# Patient Record
Sex: FEMALE | Race: WHITE | NOT HISPANIC OR LATINO | Employment: OTHER | URBAN - METROPOLITAN AREA
[De-identification: names, ages, dates, MRNs, and addresses within clinical notes are randomized per-mention and may not be internally consistent; named-entity substitution may affect disease eponyms.]

---

## 2023-10-10 ENCOUNTER — HOSPITAL ENCOUNTER (OUTPATIENT)
Facility: HOSPITAL | Age: 73
Discharge: NURSING, REHAB OR LONG-TERM CARE | End: 2023-10-17
Attending: EMERGENCY MEDICINE | Admitting: HOSPITALIST
Payer: MEDICARE

## 2023-10-10 DIAGNOSIS — I50.32 CHRONIC DIASTOLIC HEART FAILURE: ICD-10-CM

## 2023-10-10 DIAGNOSIS — N39.0 URINARY TRACT INFECTION WITHOUT HEMATURIA, SITE UNSPECIFIED: ICD-10-CM

## 2023-10-10 DIAGNOSIS — L89.152 SACRAL DECUBITUS ULCER, STAGE II: ICD-10-CM

## 2023-10-10 DIAGNOSIS — M79.603 ARM PAIN: ICD-10-CM

## 2023-10-10 DIAGNOSIS — R93.89 MEDIASTINAL WIDENING: ICD-10-CM

## 2023-10-10 DIAGNOSIS — R07.9 CHEST PAIN: ICD-10-CM

## 2023-10-10 DIAGNOSIS — N18.9 ACUTE KIDNEY INJURY SUPERIMPOSED ON CHRONIC KIDNEY DISEASE: ICD-10-CM

## 2023-10-10 DIAGNOSIS — I95.1 ORTHOSTATIC HYPOTENSION: ICD-10-CM

## 2023-10-10 DIAGNOSIS — I77.810 AORTIC ROOT DILATATION: Primary | ICD-10-CM

## 2023-10-10 DIAGNOSIS — E86.0 DEHYDRATION: ICD-10-CM

## 2023-10-10 DIAGNOSIS — I49.1 PAC (PREMATURE ATRIAL CONTRACTION): ICD-10-CM

## 2023-10-10 DIAGNOSIS — N17.9 ACUTE KIDNEY INJURY SUPERIMPOSED ON CHRONIC KIDNEY DISEASE: ICD-10-CM

## 2023-10-10 PROBLEM — Z86.73 HISTORY OF CVA (CEREBROVASCULAR ACCIDENT): Status: ACTIVE | Noted: 2023-10-10

## 2023-10-10 PROBLEM — M25.519 SHOULDER PAIN: Status: ACTIVE | Noted: 2023-10-10

## 2023-10-10 PROBLEM — R55 SYNCOPE: Status: ACTIVE | Noted: 2023-10-10

## 2023-10-10 PROBLEM — N18.30 ACUTE RENAL FAILURE SUPERIMPOSED ON STAGE 3 CHRONIC KIDNEY DISEASE: Status: ACTIVE | Noted: 2023-10-10

## 2023-10-10 LAB
ALBUMIN SERPL BCP-MCNC: 3.3 G/DL (ref 3.5–5.2)
ALP SERPL-CCNC: 104 U/L (ref 55–135)
ALT SERPL W/O P-5'-P-CCNC: 7 U/L (ref 10–44)
ANION GAP SERPL CALC-SCNC: 13 MMOL/L (ref 8–16)
AST SERPL-CCNC: 11 U/L (ref 10–40)
BACTERIA #/AREA URNS HPF: ABNORMAL /HPF
BASOPHILS # BLD AUTO: 0.03 K/UL (ref 0–0.2)
BASOPHILS NFR BLD: 0.6 % (ref 0–1.9)
BILIRUB SERPL-MCNC: 0.6 MG/DL (ref 0.1–1)
BILIRUB UR QL STRIP: NEGATIVE
BNP SERPL-MCNC: 86 PG/ML (ref 0–99)
BUN SERPL-MCNC: 78 MG/DL (ref 8–23)
CALCIUM SERPL-MCNC: 9.4 MG/DL (ref 8.7–10.5)
CHLORIDE SERPL-SCNC: 104 MMOL/L (ref 95–110)
CLARITY UR: ABNORMAL
CO2 SERPL-SCNC: 15 MMOL/L (ref 23–29)
COLOR UR: YELLOW
CREAT SERPL-MCNC: 2.6 MG/DL (ref 0.5–1.4)
CREAT UR-MCNC: 60 MG/DL (ref 15–325)
CTP QC/QA: YES
DIFFERENTIAL METHOD: ABNORMAL
EOSINOPHIL # BLD AUTO: 0.1 K/UL (ref 0–0.5)
EOSINOPHIL NFR BLD: 1.6 % (ref 0–8)
ERYTHROCYTE [DISTWIDTH] IN BLOOD BY AUTOMATED COUNT: 12.1 % (ref 11.5–14.5)
EST. GFR  (NO RACE VARIABLE): 19 ML/MIN/1.73 M^2
GLUCOSE SERPL-MCNC: 100 MG/DL (ref 70–110)
GLUCOSE UR QL STRIP: NEGATIVE
HCT VFR BLD AUTO: 34.4 % (ref 37–48.5)
HGB BLD-MCNC: 11 G/DL (ref 12–16)
HGB UR QL STRIP: ABNORMAL
IMM GRANULOCYTES # BLD AUTO: 0.01 K/UL (ref 0–0.04)
IMM GRANULOCYTES NFR BLD AUTO: 0.2 % (ref 0–0.5)
INR PPP: 1 (ref 0.8–1.2)
KETONES UR QL STRIP: NEGATIVE
LACTATE SERPL-SCNC: 2.2 MMOL/L (ref 0.5–2.2)
LEUKOCYTE ESTERASE UR QL STRIP: ABNORMAL
LYMPHOCYTES # BLD AUTO: 0.8 K/UL (ref 1–4.8)
LYMPHOCYTES NFR BLD: 16.9 % (ref 18–48)
MAGNESIUM SERPL-MCNC: 1.8 MG/DL (ref 1.6–2.6)
MCH RBC QN AUTO: 29.7 PG (ref 27–31)
MCHC RBC AUTO-ENTMCNC: 32 G/DL (ref 32–36)
MCV RBC AUTO: 93 FL (ref 82–98)
MICROSCOPIC COMMENT: ABNORMAL
MONOCYTES # BLD AUTO: 0.2 K/UL (ref 0.3–1)
MONOCYTES NFR BLD: 3.5 % (ref 4–15)
NEUTROPHILS # BLD AUTO: 3.7 K/UL (ref 1.8–7.7)
NEUTROPHILS NFR BLD: 77.2 % (ref 38–73)
NITRITE UR QL STRIP: NEGATIVE
NRBC BLD-RTO: 0 /100 WBC
PH UR STRIP: 5 [PH] (ref 5–8)
PLATELET # BLD AUTO: 269 K/UL (ref 150–450)
PMV BLD AUTO: 9.2 FL (ref 9.2–12.9)
POTASSIUM SERPL-SCNC: 4.4 MMOL/L (ref 3.5–5.1)
PROT SERPL-MCNC: 7 G/DL (ref 6–8.4)
PROT UR QL STRIP: ABNORMAL
PROTHROMBIN TIME: 10.9 SEC (ref 9–12.5)
RBC # BLD AUTO: 3.7 M/UL (ref 4–5.4)
RBC #/AREA URNS HPF: 2 /HPF (ref 0–4)
SARS-COV-2 RDRP RESP QL NAA+PROBE: NEGATIVE
SODIUM SERPL-SCNC: 132 MMOL/L (ref 136–145)
SODIUM UR-SCNC: 47 MMOL/L (ref 20–250)
SP GR UR STRIP: 1.01 (ref 1–1.03)
TROPONIN I SERPL DL<=0.01 NG/ML-MCNC: 0.01 NG/ML (ref 0–0.03)
URN SPEC COLLECT METH UR: ABNORMAL
UROBILINOGEN UR STRIP-ACNC: NEGATIVE EU/DL
WBC # BLD AUTO: 4.85 K/UL (ref 3.9–12.7)
WBC #/AREA URNS HPF: >100 /HPF (ref 0–5)
WBC CLUMPS URNS QL MICRO: ABNORMAL

## 2023-10-10 PROCEDURE — 83605 ASSAY OF LACTIC ACID: CPT | Performed by: EMERGENCY MEDICINE

## 2023-10-10 PROCEDURE — 83735 ASSAY OF MAGNESIUM: CPT | Performed by: EMERGENCY MEDICINE

## 2023-10-10 PROCEDURE — 82570 ASSAY OF URINE CREATININE: CPT | Performed by: EMERGENCY MEDICINE

## 2023-10-10 PROCEDURE — 96361 HYDRATE IV INFUSION ADD-ON: CPT

## 2023-10-10 PROCEDURE — 96365 THER/PROPH/DIAG IV INF INIT: CPT

## 2023-10-10 PROCEDURE — 87088 URINE BACTERIA CULTURE: CPT | Performed by: EMERGENCY MEDICINE

## 2023-10-10 PROCEDURE — 93010 EKG 12-LEAD: ICD-10-PCS | Mod: ,,, | Performed by: INTERNAL MEDICINE

## 2023-10-10 PROCEDURE — 83880 ASSAY OF NATRIURETIC PEPTIDE: CPT | Performed by: EMERGENCY MEDICINE

## 2023-10-10 PROCEDURE — 87186 SC STD MICRODIL/AGAR DIL: CPT | Performed by: EMERGENCY MEDICINE

## 2023-10-10 PROCEDURE — G0378 HOSPITAL OBSERVATION PER HR: HCPCS

## 2023-10-10 PROCEDURE — 85610 PROTHROMBIN TIME: CPT | Performed by: EMERGENCY MEDICINE

## 2023-10-10 PROCEDURE — 93010 ELECTROCARDIOGRAM REPORT: CPT | Mod: ,,, | Performed by: INTERNAL MEDICINE

## 2023-10-10 PROCEDURE — 87086 URINE CULTURE/COLONY COUNT: CPT | Performed by: EMERGENCY MEDICINE

## 2023-10-10 PROCEDURE — 87077 CULTURE AEROBIC IDENTIFY: CPT | Performed by: EMERGENCY MEDICINE

## 2023-10-10 PROCEDURE — 80053 COMPREHEN METABOLIC PANEL: CPT | Performed by: EMERGENCY MEDICINE

## 2023-10-10 PROCEDURE — 84300 ASSAY OF URINE SODIUM: CPT | Performed by: EMERGENCY MEDICINE

## 2023-10-10 PROCEDURE — 99285 EMERGENCY DEPT VISIT HI MDM: CPT | Mod: 25

## 2023-10-10 PROCEDURE — 25000003 PHARM REV CODE 250: Performed by: PHYSICIAN ASSISTANT

## 2023-10-10 PROCEDURE — 81000 URINALYSIS NONAUTO W/SCOPE: CPT | Performed by: EMERGENCY MEDICINE

## 2023-10-10 PROCEDURE — 63600175 PHARM REV CODE 636 W HCPCS: Performed by: STUDENT IN AN ORGANIZED HEALTH CARE EDUCATION/TRAINING PROGRAM

## 2023-10-10 PROCEDURE — 25000003 PHARM REV CODE 250: Performed by: STUDENT IN AN ORGANIZED HEALTH CARE EDUCATION/TRAINING PROGRAM

## 2023-10-10 PROCEDURE — 63600175 PHARM REV CODE 636 W HCPCS: Performed by: EMERGENCY MEDICINE

## 2023-10-10 PROCEDURE — 93005 ELECTROCARDIOGRAM TRACING: CPT

## 2023-10-10 PROCEDURE — 85025 COMPLETE CBC W/AUTO DIFF WBC: CPT | Performed by: EMERGENCY MEDICINE

## 2023-10-10 PROCEDURE — 84484 ASSAY OF TROPONIN QUANT: CPT | Performed by: EMERGENCY MEDICINE

## 2023-10-10 PROCEDURE — 87635 SARS-COV-2 COVID-19 AMP PRB: CPT | Performed by: EMERGENCY MEDICINE

## 2023-10-10 PROCEDURE — 96375 TX/PRO/DX INJ NEW DRUG ADDON: CPT

## 2023-10-10 PROCEDURE — 25000003 PHARM REV CODE 250: Performed by: EMERGENCY MEDICINE

## 2023-10-10 PROCEDURE — 87040 BLOOD CULTURE FOR BACTERIA: CPT | Mod: 59 | Performed by: EMERGENCY MEDICINE

## 2023-10-10 RX ORDER — AMOXICILLIN 250 MG
1 CAPSULE ORAL DAILY PRN
Status: DISCONTINUED | OUTPATIENT
Start: 2023-10-10 | End: 2023-10-17 | Stop reason: HOSPADM

## 2023-10-10 RX ORDER — ERGOCALCIFEROL 1.25 MG/1
1 CAPSULE ORAL DAILY
COMMUNITY

## 2023-10-10 RX ORDER — IBUPROFEN 200 MG
24 TABLET ORAL
Status: DISCONTINUED | OUTPATIENT
Start: 2023-10-10 | End: 2023-10-17 | Stop reason: HOSPADM

## 2023-10-10 RX ORDER — LANOLIN ALCOHOL/MO/W.PET/CERES
800 CREAM (GRAM) TOPICAL
Status: DISCONTINUED | OUTPATIENT
Start: 2023-10-10 | End: 2023-10-17 | Stop reason: HOSPADM

## 2023-10-10 RX ORDER — SODIUM CHLORIDE 0.9 % (FLUSH) 0.9 %
10 SYRINGE (ML) INJECTION EVERY 8 HOURS
Status: DISCONTINUED | OUTPATIENT
Start: 2023-10-10 | End: 2023-10-10

## 2023-10-10 RX ORDER — ACETAMINOPHEN 325 MG/1
650 TABLET ORAL EVERY 4 HOURS PRN
Status: DISCONTINUED | OUTPATIENT
Start: 2023-10-10 | End: 2023-10-17 | Stop reason: HOSPADM

## 2023-10-10 RX ORDER — TELMISARTAN 80 MG/1
80 TABLET ORAL DAILY
Status: ON HOLD | COMMUNITY
Start: 2023-08-18 | End: 2023-10-13 | Stop reason: HOSPADM

## 2023-10-10 RX ORDER — SODIUM CHLORIDE 9 MG/ML
125 INJECTION, SOLUTION INTRAVENOUS ONCE
Status: COMPLETED | OUTPATIENT
Start: 2023-10-10 | End: 2023-10-10

## 2023-10-10 RX ORDER — MORPHINE SULFATE 4 MG/ML
4 INJECTION, SOLUTION INTRAMUSCULAR; INTRAVENOUS
Status: COMPLETED | OUTPATIENT
Start: 2023-10-10 | End: 2023-10-10

## 2023-10-10 RX ORDER — SODIUM CHLORIDE 9 MG/ML
INJECTION, SOLUTION INTRAVENOUS CONTINUOUS
Status: DISCONTINUED | OUTPATIENT
Start: 2023-10-10 | End: 2023-10-11

## 2023-10-10 RX ORDER — NALOXONE HCL 0.4 MG/ML
0.02 VIAL (ML) INJECTION
Status: DISCONTINUED | OUTPATIENT
Start: 2023-10-10 | End: 2023-10-17 | Stop reason: HOSPADM

## 2023-10-10 RX ORDER — PNV NO.95/FERROUS FUM/FOLIC AC 28MG-0.8MG
100 TABLET ORAL DAILY
COMMUNITY

## 2023-10-10 RX ORDER — SODIUM CHLORIDE 0.9 % (FLUSH) 0.9 %
10 SYRINGE (ML) INJECTION
Status: DISCONTINUED | OUTPATIENT
Start: 2023-10-10 | End: 2023-10-17 | Stop reason: HOSPADM

## 2023-10-10 RX ORDER — TALC
6 POWDER (GRAM) TOPICAL NIGHTLY PRN
Status: DISCONTINUED | OUTPATIENT
Start: 2023-10-10 | End: 2023-10-17 | Stop reason: HOSPADM

## 2023-10-10 RX ORDER — GLUCAGON 1 MG
1 KIT INJECTION
Status: DISCONTINUED | OUTPATIENT
Start: 2023-10-10 | End: 2023-10-17 | Stop reason: HOSPADM

## 2023-10-10 RX ORDER — IBUPROFEN 200 MG
16 TABLET ORAL
Status: DISCONTINUED | OUTPATIENT
Start: 2023-10-10 | End: 2023-10-17 | Stop reason: HOSPADM

## 2023-10-10 RX ADMIN — MORPHINE SULFATE 4 MG: 4 INJECTION, SOLUTION INTRAMUSCULAR; INTRAVENOUS at 05:10

## 2023-10-10 RX ADMIN — SODIUM CHLORIDE 125 ML/HR: 9 INJECTION, SOLUTION INTRAVENOUS at 05:10

## 2023-10-10 RX ADMIN — SODIUM CHLORIDE: 9 INJECTION, SOLUTION INTRAVENOUS at 10:10

## 2023-10-10 RX ADMIN — SODIUM CHLORIDE 1000 ML: 9 INJECTION, SOLUTION INTRAVENOUS at 05:10

## 2023-10-10 RX ADMIN — CEFTRIAXONE 1 G: 1 INJECTION, POWDER, FOR SOLUTION INTRAMUSCULAR; INTRAVENOUS at 11:10

## 2023-10-10 NOTE — ED PROVIDER NOTES
Encounter Date: 10/10/2023    SCRIBE #1 NOTE: I, Mohamud Paredes, am scribing for, and in the presence of,  Anthony Rosas MD. I have scribed the following portions of the note - Other sections scribed: HPI,ROS,PE.       History     Chief Complaint   Patient presents with    Dizziness     Pt presents to the ED via NO EMS  PT has complaints of intermittent dizziness, fecal / urinary incontinence, non healing wound to buttocks and diarrhea  Per EMS the patient was hypotensive upon arrival in 70/44  EMS administered 1 liter of LR pts /70 at triage  Pt has left sided paralysis (pt leaves at [facility] alone )  Pt Alert at [facility]     Traci Blanca is a 72 y.o. female with a PMHx of CVA (with residual L sided paralysis, 19y ago), who presents to the ED for evaluation of syncope, hypotension onset around two weeks ago. Patient states she has been having multiple syncopal episodes throughout the past two weeks. She also injured her right shoulder around two weeks ago after falling from a chair, which has restricted her activity since. No known head trauma. Today, she decided to activate EMS, and upon EMS arrival to scene, the patient was hypotensive upon arrival in 70/44. EMS administered 1 liter of LR. Upon arrival, patient's BP was 114/70 at triage.    The history is provided by the patient. No  was used.     Review of patient's allergies indicates:  No Known Allergies  No past medical history on file.  No past surgical history on file.  No family history on file.     Review of Systems   Constitutional:  Negative for fever.   HENT:  Negative for congestion, sore throat and trouble swallowing.    Respiratory:  Negative for cough and shortness of breath.    Cardiovascular:  Negative for chest pain.   Gastrointestinal:  Negative for abdominal pain, constipation, diarrhea, nausea and vomiting.   Genitourinary:  Negative for dysuria, flank pain, frequency and urgency.   Musculoskeletal:  Negative for  back pain.   Skin:  Negative for rash.   Neurological:  Positive for syncope. Negative for headaches.       Physical Exam     Initial Vitals [10/10/23 1600]   BP Pulse Resp Temp SpO2   114/70 86 18 97.7 °F (36.5 °C) 97 %      MAP       --         Physical Exam    Nursing note and vitals reviewed.  Constitutional: She appears well-developed and well-nourished.   Minimal dysarthria.   HENT:   Head: Normocephalic and atraumatic.   Mouth dry.   Eyes: EOM are normal. Pupils are equal, round, and reactive to light.   Neck: Neck supple. No thyromegaly present. No JVD present.   Normal range of motion.  Cardiovascular:  Normal rate and regular rhythm.     Exam reveals no gallop and no friction rub.       No murmur heard.  Pulmonary/Chest: Breath sounds normal. No respiratory distress.   Abdominal: Abdomen is soft. Bowel sounds are normal. There is no abdominal tenderness.   Abdomen benign.   Musculoskeletal:         General: No tenderness or edema. Normal range of motion.      Cervical back: Normal range of motion and neck supple.      Comments: Left side paralysis.     Neurological: She is alert and oriented to person, place, and time. She has normal strength. GCS score is 15. GCS eye subscore is 4. GCS verbal subscore is 5. GCS motor subscore is 6.   Skin: Skin is warm and dry. Capillary refill takes less than 2 seconds.   Psychiatric: She has a normal mood and affect.         ED Course   Procedures  Labs Reviewed   CBC W/ AUTO DIFFERENTIAL - Abnormal; Notable for the following components:       Result Value    RBC 3.70 (*)     Hemoglobin 11.0 (*)     Hematocrit 34.4 (*)     Lymph # 0.8 (*)     Mono # 0.2 (*)     Gran % 77.2 (*)     Lymph % 16.9 (*)     Mono % 3.5 (*)     All other components within normal limits   COMPREHENSIVE METABOLIC PANEL - Abnormal; Notable for the following components:    Sodium 132 (*)     CO2 15 (*)     BUN 78 (*)     Creatinine 2.6 (*)     Albumin 3.3 (*)     ALT 7 (*)     eGFR 19 (*)     All  other components within normal limits   URINALYSIS, REFLEX TO URINE CULTURE - Abnormal; Notable for the following components:    Appearance, UA Hazy (*)     Protein, UA Trace (*)     Occult Blood UA Trace (*)     Leukocytes, UA 3+ (*)     All other components within normal limits    Narrative:     Specimen Source->Urine   URINALYSIS MICROSCOPIC - Abnormal; Notable for the following components:    WBC, UA >100 (*)     WBC Clumps, UA Many (*)     Bacteria Moderate (*)     All other components within normal limits    Narrative:     Specimen Source->Urine   CBC W/ AUTO DIFFERENTIAL - Abnormal; Notable for the following components:    RBC 3.98 (*)     MCHC 31.5 (*)     MPV 9.1 (*)     Mono # 0.2 (*)     Mono % 3.5 (*)     All other components within normal limits   COMPREHENSIVE METABOLIC PANEL - Abnormal; Notable for the following components:    Sodium 134 (*)     CO2 14 (*)     BUN 67 (*)     Creatinine 2.3 (*)     Albumin 3.1 (*)     ALT 8 (*)     eGFR 22 (*)     All other components within normal limits   LACTIC ACID, PLASMA   PROTIME-INR   TROPONIN I   B-TYPE NATRIURETIC PEPTIDE   MAGNESIUM   CREATININE, URINE, RANDOM   SODIUM, URINE, RANDOM   SODIUM, URINE, RANDOM    Narrative:     Specimen Source->Urine   CREATININE, URINE, RANDOM    Narrative:     Specimen Source->Urine   SARS-COV-2 RDRP GENE        ECG Results              EKG 12-lead (Final result)  Result time 10/12/23 00:05:57      Final result by Interface, Lab In [facility] (10/12/23 00:05:57)                   Narrative:    Test Reason : I95.1,    Vent. Rate : 085 BPM     Atrial Rate : 085 BPM     P-R Int : 204 ms          QRS Dur : 110 ms      QT Int : 402 ms       P-R-T Axes : 032 -40 039 degrees     QTc Int : 478 ms    Sinus rhythm with Premature atrial complexes  Left axis deviation  Low voltage QRS  Incomplete right bundle branch block  Cannot rule out Anteroseptal infarct (cited on or before 10-OCT-2023)  Abnormal ECG  When compared with ECG of  10-OCT-2023 16:52,  Significant changes have occurred  Confirmed by Delio AGUIRRE, Kelsey HALL ([id number]) on 10/12/2023 12:05:52 AM    Referred By: [email or web address]   SELF           Confirmed By:Kelsey Kebede MD                                  Imaging Results               CT Chest Abdomen Pelvis Without Contrast (XPD) (Final result)  Result time 10/10/23 22:45:58      Final result by Vangie Heard MD (10/10/23 22:45:58)                   Impression:      Aneurysmal dilatation of the ascending thoracic aorta and common iliac arteries.  Moderate atherosclerotic changes.  If concern for dissection, consider additional imaging.    3 mm left apical pulmonary nodule.  For a solid nodule <6 mm, Fleischner Society 2017 guidelines recommend no routine follow up for a low risk patient, or follow-up with non-contrast chest CT at 12 months in a high risk patient.    Indeterminate low-attenuation left adrenal lesion.    Colonic diverticulosis.    Air-fluid level in the urinary bladder.  Differential considerations may include attempted instrumentation with a Fitzgerald catheter, fistulous formation, or gas-forming organism.    This report was flagged in Epic as abnormal.      Electronically signed by: Vangie Heard  Date:    10/10/2023  Time:    22:45               Narrative:    EXAMINATION:  CT CHEST ABDOMEN PELVIS WITHOUT    CLINICAL HISTORY:  Evaluation of syncope.  Hypotension onset 2 weeks ago.    TECHNIQUE:  1.25 mm unenhanced axial images from the lung apices through the greater trochanters were performed.  Coronal and sagittal reformatted images were provided.  No IV contrast was given.    COMPARISON:  None.    FINDINGS:  In the chest, there is aneurysmal dilatation of the ascending thoracic aorta measuring up to 4.9 cm (series 2 axial image 69).  The thoracic aorta is tortuous.  There is moderate atherosclerotic changes.  A 3 mm pulmonary nodule is seen at the left lung apex (series 4 axial image 98).    Within the limits of a  noncontrast examination, in the abdomen, there is no aneurysmal dilatation of the tortuous abdominal aorta.  There are moderate atherosclerotic changes.  The liver, spleen, pancreas, kidneys, and right adrenal gland are unremarkable.  The gallbladder is surgically absent.  There is a low-attenuation lobular left adrenal lesion measuring 2.0 x 1.8 cm (series 2 axial image 118). There is no gross abdominal adenopathy or ascites.  Moderate atherosclerotic disease is present.    In the pelvis, the right common iliac artery measures up to 2 cm (series 2 axial image 175).  The left common iliac artery measures up to 1.7 cm (series 2 axial image 179).  There are no pelvic masses or adenopathy.  There is no free pelvic fluid.  There is colonic diverticulosis.  There is a calcified uterine fibroid.  A small air-fluid level is seen in the urinary bladder.  Marginal and bridging osteophytes are present.                                        X-Ray Chest AP Portable (Final result)  Result time 10/10/23 18:27:04      Final result by Da Martinez MD (10/10/23 18:27:04)                   Impression:      This report was flagged in Epic as abnormal.    1. Interstitial findings may reflect underlying COPD/emphysema.  No large focal consolidation.  2. There is prominence of the aortic arch, correlation with previous exams advised, PA and lateral could be performed for further evaluation as warranted.      Electronically signed by: Da Martinez MD  Date:    10/10/2023  Time:    18:27               Narrative:    EXAMINATION:  XR CHEST AP PORTABLE    CLINICAL HISTORY:  hypotension;    TECHNIQUE:  Single frontal view of the chest was performed.    COMPARISON:  None    FINDINGS:  The cardiomediastinal silhouette is not enlarged noting prominence of the aorta..  There is no pleural effusion.  The trachea is midline.  The lungs are symmetrically expanded bilaterally with coarse interstitial attenuation.  There are a few scattered  calcified granuloma.  No large focal consolidation seen.  There is no pneumothorax.  The osseous structures are remarkable for degenerative change..                                       X-Ray Humerus 2 View Right (Final result)  Result time 10/10/23 18:05:56      Final result by Jade Rodriguez MD (10/10/23 18:05:56)                   Impression:      No acute findings.      Electronically signed by: Jade Rodriguez  Date:    10/10/2023  Time:    18:05               Narrative:    EXAMINATION:  XR HUMERUS 2 VIEW RIGHT    CLINICAL HISTORY:  Pain in arm, unspecified    TECHNIQUE:  Four views    COMPARISON:  None    FINDINGS:  Osteopenia.  No acute fracture or dislocation.  Mild glenohumeral and moderate acromioclavicular joint osteoarthritis.                                       Medications   melatonin tablet 6 mg (has no administration in time range)   senna-docusate 8.6-50 mg per tablet 1 tablet (has no administration in time range)   acetaminophen tablet 650 mg (650 mg Oral Given 10/12/23 0602)   naloxone 0.4 mg/mL injection 0.02 mg (has no administration in time range)   magnesium oxide tablet 800 mg (has no administration in time range)   magnesium oxide tablet 800 mg (has no administration in time range)   glucose chewable tablet 16 g (has no administration in time range)   glucose chewable tablet 24 g (has no administration in time range)   glucagon (human recombinant) injection 1 mg (has no administration in time range)   sodium chloride 0.9% flush 10 mL (has no administration in time range)   cefTRIAXone (ROCEPHIN) 1 g in dextrose 5 % in water (D5W) 100 mL IVPB (MB+) (0 g Intravenous Stopped 10/11/23 2347)   oxyCODONE-acetaminophen 5-325 mg per tablet 1 tablet (0 tablets Oral Return to Cabinet 10/11/23 2020)   heparin (porcine) injection 5,000 Units (5,000 Units Subcutaneous Not Given 10/12/23 1400)   regadenoson injection 0.4 mg ( Intravenous Canceled Entry 10/12/23 1130)   0.9%  NaCl infusion (  Intravenous New Bag 10/12/23 1317)   magnesium sulfate in dextrose IVPB (premix) 1 g (1 g Intravenous New Bag 10/12/23 1317)   sodium chloride 0.9% bolus 1,000 mL 1,000 mL (0 mLs Intravenous Stopped 10/10/23 1825)   0.9%  NaCl infusion (0 mL/hr Intravenous Stopped 10/10/23 1800)   morphine injection 4 mg (4 mg Intravenous Given 10/10/23 1731)   cefTRIAXone (ROCEPHIN) 1 g in dextrose 5 % in water (D5W) 100 mL IVPB (MB+) (0 g Intravenous Stopped 10/10/23 2341)   morphine injection 4 mg (4 mg Intravenous Given 10/11/23 0252)     Medical Decision Making  72 y.o. female with a PMHx of CVA, who presents to the ED for evaluation of syncope onset around two weeks ago. On exam, minimal dysarthria, mouth is dry, there is left side paralysis, and abdomen is benign. In shared decision making, I will order labs and imaging.    Amount and/or Complexity of Data Reviewed  Labs: ordered.  Radiology: ordered.    Risk  OTC drugs.  Prescription drug management.    Pt with history of stroke resulting in left sided paralysis states she lives alone in an apartment in [place] and has been able to care for herself well up til two weeks ago when she feel and injured her right shoulder. With decreased functionality with right arm and history of left arm paralysis pt reports difficulty caring for self . She reports being seen at [facility] ER after the fall and discharged.  Called EMS due to dizziness. EMS found patient to have BP 70's/40's. Improved with IVFs. PT's main complaint other than self care difficulties is buttocks pain. SHe has been eating and drinking less due to concern about sitting in her own urine and stool. She reports intermittent help from friends and neighbors. Work up shows, uti, allen on top of CKI, incidental widened mediastinum. NO CP. BP similar in all 4 extremities. Pt needs IV Hydration, UTI tx, Wound care for sacral skin break down, PT/OT evaluation and rehab placement.         Scribe Attestation:   Scribe #1: I  performed the above scribed service and the documentation accurately describes the services I performed. I attest to the accuracy of the note.              I, Anthony Rosas, personally performed the services described in this documentation.  All medical record entries made by the scribe were at my direction and in my presence.  I have reviewed the chart and agree that the record reflects my personal performance and is accurate and complete.           Clinical Impression:   Final diagnoses:  [I95.1] Orthostatic hypotension  [M79.603] Arm pain  [E86.0] Dehydration  [R93.89] Mediastinal widening  [R07.9] Chest pain  [N17.9, N18.9] Acute kidney injury superimposed on chronic kidney disease (Primary)  [N39.0] Urinary tract infection without hematuria, site unspecified  [L89.152] Sacral decubitus ulcer, stage II        ED Disposition Condition    Observation Stable                Anthony Rosas MD  10/12/23 4412

## 2023-10-10 NOTE — Clinical Note
Diagnosis: Dehydration [276.51.ICD-9-CM]   Future Attending Provider: ADEEL CONTEH [[id number]]   Admitting Provider:: ADEEL CONTEH [[id number]]

## 2023-10-11 PROBLEM — I49.3 PVC (PREMATURE VENTRICULAR CONTRACTION): Status: ACTIVE | Noted: 2023-10-11

## 2023-10-11 PROBLEM — N39.0 URINARY TRACT INFECTION WITHOUT HEMATURIA: Status: ACTIVE | Noted: 2023-10-11

## 2023-10-11 PROBLEM — E87.20 METABOLIC ACIDOSIS: Status: ACTIVE | Noted: 2023-10-11

## 2023-10-11 LAB
ALBUMIN SERPL BCP-MCNC: 3.1 G/DL (ref 3.5–5.2)
ALP SERPL-CCNC: 102 U/L (ref 55–135)
ALT SERPL W/O P-5'-P-CCNC: 8 U/L (ref 10–44)
ANION GAP SERPL CALC-SCNC: 12 MMOL/L (ref 8–16)
ASCENDING AORTA: 4.64 CM
AST SERPL-CCNC: 13 U/L (ref 10–40)
AV INDEX (PROSTH): 0.98
AV MEAN GRADIENT: 4 MMHG
AV PEAK GRADIENT: 6 MMHG
AV REGURGITATION PRESSURE HALF TIME: 876.68 MS
AV VALVE AREA BY VELOCITY RATIO: 2.94 CM²
AV VALVE AREA: 3.1 CM²
AV VELOCITY RATIO: 0.93
BASOPHILS # BLD AUTO: 0.03 K/UL (ref 0–0.2)
BASOPHILS NFR BLD: 0.6 % (ref 0–1.9)
BILIRUB SERPL-MCNC: 0.4 MG/DL (ref 0.1–1)
BUN SERPL-MCNC: 67 MG/DL (ref 8–23)
CALCIUM SERPL-MCNC: 9 MG/DL (ref 8.7–10.5)
CHLORIDE SERPL-SCNC: 108 MMOL/L (ref 95–110)
CO2 SERPL-SCNC: 14 MMOL/L (ref 23–29)
CREAT SERPL-MCNC: 2.3 MG/DL (ref 0.5–1.4)
CV ECHO LV RWT: 0.53 CM
DIFFERENTIAL METHOD: ABNORMAL
DOP CALC AO PEAK VEL: 1.26 M/S
DOP CALC AO VTI: 22.1 CM
DOP CALC LVOT AREA: 3.2 CM2
DOP CALC LVOT DIAMETER: 2.01 CM
DOP CALC LVOT PEAK VEL: 1.17 M/S
DOP CALC LVOT STROKE VOLUME: 68.5 CM3
DOP CALC MV VTI: 15.9 CM
DOP CALCLVOT PEAK VEL VTI: 21.6 CM
E WAVE DECELERATION TIME: 184.18 MSEC
E/A RATIO: 0.84
E/E' RATIO: 10.17 M/S
ECHO LV POSTERIOR WALL: 1.12 CM (ref 0.6–1.1)
EOSINOPHIL # BLD AUTO: 0.1 K/UL (ref 0–0.5)
EOSINOPHIL NFR BLD: 1.9 % (ref 0–8)
ERYTHROCYTE [DISTWIDTH] IN BLOOD BY AUTOMATED COUNT: 12.2 % (ref 11.5–14.5)
EST. GFR  (NO RACE VARIABLE): 22 ML/MIN/1.73 M^2
FRACTIONAL SHORTENING: 28 % (ref 28–44)
GLUCOSE SERPL-MCNC: 88 MG/DL (ref 70–110)
HCT VFR BLD AUTO: 38.4 % (ref 37–48.5)
HGB BLD-MCNC: 12.1 G/DL (ref 12–16)
IMM GRANULOCYTES # BLD AUTO: 0.02 K/UL (ref 0–0.04)
IMM GRANULOCYTES NFR BLD AUTO: 0.4 % (ref 0–0.5)
INTERVENTRICULAR SEPTUM: 1.06 CM (ref 0.6–1.1)
IVC DIAMETER: 1.82 CM
LA MAJOR: 4.19 CM
LA MINOR: 4.83 CM
LA WIDTH: 2.5 CM
LEFT ATRIUM SIZE: 2.82 CM
LEFT ATRIUM VOLUME: 26.89 CM3
LEFT INTERNAL DIMENSION IN SYSTOLE: 3.02 CM (ref 2.1–4)
LEFT VENTRICLE DIASTOLIC VOLUME: 78 ML
LEFT VENTRICLE SYSTOLIC VOLUME: 35.66 ML
LEFT VENTRICULAR INTERNAL DIMENSION IN DIASTOLE: 4.19 CM (ref 3.5–6)
LEFT VENTRICULAR MASS: 154.45 G
LV LATERAL E/E' RATIO: 6.78 M/S
LV SEPTAL E/E' RATIO: 20.33 M/S
LVOT MG: 2.65 MMHG
LVOT MV: 0.75 CM/S
LYMPHOCYTES # BLD AUTO: 1.1 K/UL (ref 1–4.8)
LYMPHOCYTES NFR BLD: 22.3 % (ref 18–48)
MCH RBC QN AUTO: 30.4 PG (ref 27–31)
MCHC RBC AUTO-ENTMCNC: 31.5 G/DL (ref 32–36)
MCV RBC AUTO: 97 FL (ref 82–98)
MONOCYTES # BLD AUTO: 0.2 K/UL (ref 0.3–1)
MONOCYTES NFR BLD: 3.5 % (ref 4–15)
MV MEAN GRADIENT: 1 MMHG
MV PEAK A VEL: 0.73 M/S
MV PEAK E VEL: 0.61 M/S
MV PEAK GRADIENT: 2 MMHG
MV STENOSIS PRESSURE HALF TIME: 53.41 MS
MV VALVE AREA BY CONTINUITY EQUATION: 4.31 CM2
MV VALVE AREA P 1/2 METHOD: 4.12 CM2
NEUTROPHILS # BLD AUTO: 3.4 K/UL (ref 1.8–7.7)
NEUTROPHILS NFR BLD: 71.3 % (ref 38–73)
NRBC BLD-RTO: 0 /100 WBC
PISA AR MAX VEL: 3.98 M/S
PISA TR MAX VEL: 1.89 M/S
PLATELET # BLD AUTO: 245 K/UL (ref 150–450)
PMV BLD AUTO: 9.1 FL (ref 9.2–12.9)
POTASSIUM SERPL-SCNC: 4.2 MMOL/L (ref 3.5–5.1)
PROT SERPL-MCNC: 6.8 G/DL (ref 6–8.4)
PV PEAK GRADIENT: 2 MMHG
PV PEAK VELOCITY: 0.67 M/S
RA MAJOR: 4.09 CM
RA PRESSURE ESTIMATED: 3 MMHG
RA WIDTH: 3.5 CM
RBC # BLD AUTO: 3.98 M/UL (ref 4–5.4)
RIGHT VENTRICULAR END-DIASTOLIC DIMENSION: 3.34 CM
RV TB RVSP: 5 MMHG
RV TISSUE DOPPLER FREE WALL SYSTOLIC VELOCITY 1 (APICAL 4 CHAMBER VIEW): 16.59 CM/S
SINUS: 3.77 CM
SODIUM SERPL-SCNC: 134 MMOL/L (ref 136–145)
STJ: 3.28 CM
TDI LATERAL: 0.09 M/S
TDI SEPTAL: 0.03 M/S
TDI: 0.06 M/S
TR MAX PG: 14 MMHG
TRICUSPID ANNULAR PLANE SYSTOLIC EXCURSION: 1.9 CM
TV REST PULMONARY ARTERY PRESSURE: 17 MMHG
WBC # BLD AUTO: 4.79 K/UL (ref 3.9–12.7)

## 2023-10-11 PROCEDURE — 93005 ELECTROCARDIOGRAM TRACING: CPT

## 2023-10-11 PROCEDURE — 80053 COMPREHEN METABOLIC PANEL: CPT | Performed by: PHYSICIAN ASSISTANT

## 2023-10-11 PROCEDURE — 93010 EKG 12-LEAD: ICD-10-PCS | Mod: ,,, | Performed by: INTERNAL MEDICINE

## 2023-10-11 PROCEDURE — 96376 TX/PRO/DX INJ SAME DRUG ADON: CPT

## 2023-10-11 PROCEDURE — 63600175 PHARM REV CODE 636 W HCPCS: Performed by: HOSPITALIST

## 2023-10-11 PROCEDURE — 96361 HYDRATE IV INFUSION ADD-ON: CPT

## 2023-10-11 PROCEDURE — 85025 COMPLETE CBC W/AUTO DIFF WBC: CPT | Performed by: PHYSICIAN ASSISTANT

## 2023-10-11 PROCEDURE — 99213 PR OFFICE/OUTPT VISIT, EST, LEVL III, 20-29 MIN: ICD-10-PCS | Mod: 25,,, | Performed by: INTERNAL MEDICINE

## 2023-10-11 PROCEDURE — 99213 OFFICE O/P EST LOW 20 MIN: CPT | Mod: 25,,, | Performed by: INTERNAL MEDICINE

## 2023-10-11 PROCEDURE — 25000003 PHARM REV CODE 250: Performed by: HOSPITALIST

## 2023-10-11 PROCEDURE — 96366 THER/PROPH/DIAG IV INF ADDON: CPT

## 2023-10-11 PROCEDURE — 63600175 PHARM REV CODE 636 W HCPCS: Performed by: NURSE PRACTITIONER

## 2023-10-11 PROCEDURE — G0378 HOSPITAL OBSERVATION PER HR: HCPCS

## 2023-10-11 PROCEDURE — 93010 ELECTROCARDIOGRAM REPORT: CPT | Mod: ,,, | Performed by: INTERNAL MEDICINE

## 2023-10-11 PROCEDURE — 96365 THER/PROPH/DIAG IV INF INIT: CPT | Mod: 59

## 2023-10-11 RX ORDER — HEPARIN SODIUM 5000 [USP'U]/ML
5000 INJECTION, SOLUTION INTRAVENOUS; SUBCUTANEOUS EVERY 8 HOURS
Status: DISCONTINUED | OUTPATIENT
Start: 2023-10-11 | End: 2023-10-17 | Stop reason: HOSPADM

## 2023-10-11 RX ORDER — MORPHINE SULFATE 4 MG/ML
4 INJECTION, SOLUTION INTRAMUSCULAR; INTRAVENOUS ONCE
Status: COMPLETED | OUTPATIENT
Start: 2023-10-11 | End: 2023-10-11

## 2023-10-11 RX ORDER — OXYCODONE AND ACETAMINOPHEN 5; 325 MG/1; MG/1
1 TABLET ORAL EVERY 4 HOURS PRN
Status: DISCONTINUED | OUTPATIENT
Start: 2023-10-11 | End: 2023-10-12

## 2023-10-11 RX ADMIN — MORPHINE SULFATE 4 MG: 4 INJECTION, SOLUTION INTRAMUSCULAR; INTRAVENOUS at 02:10

## 2023-10-11 RX ADMIN — OXYCODONE AND ACETAMINOPHEN 1 TABLET: 5; 325 TABLET ORAL at 08:10

## 2023-10-11 RX ADMIN — SODIUM BICARBONATE: 84 INJECTION, SOLUTION INTRAVENOUS at 06:10

## 2023-10-11 RX ADMIN — SODIUM BICARBONATE: 84 INJECTION, SOLUTION INTRAVENOUS at 09:10

## 2023-10-11 RX ADMIN — CEFTRIAXONE 1 G: 1 INJECTION, POWDER, FOR SOLUTION INTRAMUSCULAR; INTRAVENOUS at 11:10

## 2023-10-11 NOTE — SUBJECTIVE & OBJECTIVE
No past medical history on file.    No past surgical history on file.    Review of patient's allergies indicates:  No Known Allergies    No current facility-administered medications on file prior to encounter.     Current Outpatient Medications on File Prior to Encounter   Medication Sig    cyanocobalamin (VITAMIN B-12) 100 MCG tablet Take 100 mcg by mouth once daily.    ergocalciferol (ERGOCALCIFEROL) 50,000 unit Cap Take 1 capsule by mouth Daily.    telmisartan (MICARDIS) 80 MG Tab Take 80 mg by mouth Daily.     Family History    None       Tobacco Use    Smoking status: Not on file    Smokeless tobacco: Not on file   Substance and Sexual Activity    Alcohol use: Not on file    Drug use: Not on file    Sexual activity: Not on file     Review of Systems   Constitutional:  Negative for chills and fever.   HENT:  Negative for nosebleeds and tinnitus.    Eyes:  Negative for photophobia and visual disturbance.   Respiratory:  Negative for shortness of breath and wheezing.    Cardiovascular:  Negative for chest pain, palpitations and leg swelling.   Gastrointestinal:  Negative for abdominal distention, nausea and vomiting.   Genitourinary:  Negative for dysuria, flank pain and hematuria.   Musculoskeletal:  Positive for arthralgias. Negative for gait problem and joint swelling.   Skin:  Negative for rash and wound.   Neurological:  Negative for seizures and syncope.     Objective:     Vital Signs (Most Recent):  Temp: 97.7 °F (36.5 °C) (10/10/23 1600)  Pulse: 65 (10/10/23 1833)  Resp: 16 (10/10/23 1731)  BP: 134/62 (10/10/23 1832)  SpO2: 97 % (10/10/23 1832) Vital Signs (24h Range):  Temp:  [97.7 °F (36.5 °C)] 97.7 °F (36.5 °C)  Pulse:  [65-89] 65  Resp:  [16-20] 16  SpO2:  [95 %-99 %] 97 %  BP: ()/(62-75) 134/62        There is no height or weight on file to calculate BMI.     Physical Exam  Vitals and nursing note reviewed.   Constitutional:       General: She is not in acute distress.     Appearance: She is  well-developed. She is not diaphoretic.   HENT:      Head: Normocephalic and atraumatic.      Right Ear: External ear normal.      Left Ear: External ear normal.   Eyes:      General:         Right eye: No discharge.         Left eye: No discharge.      Conjunctiva/sclera: Conjunctivae normal.   Neck:      Thyroid: No thyromegaly.   Cardiovascular:      Rate and Rhythm: Normal rate and regular rhythm.      Heart sounds: No murmur heard.  Pulmonary:      Effort: Pulmonary effort is normal. No respiratory distress.      Breath sounds: Normal breath sounds.   Abdominal:      General: Bowel sounds are normal. There is no distension.      Palpations: Abdomen is soft. There is no mass.      Tenderness: There is no abdominal tenderness.   Musculoskeletal:         General: No deformity.      Cervical back: Normal range of motion and neck supple.      Right lower leg: No edema.      Left lower leg: No edema.   Skin:     General: Skin is warm and dry.   Neurological:      Mental Status: She is alert and oriented to person, place, and time.      Sensory: No sensory deficit.      Comments: Right sided upper and lower paralysis. Baseline per patient since previous CVA   Psychiatric:         Mood and Affect: Mood normal.         Behavior: Behavior normal.                Significant Labs: CBC:   Recent Labs   Lab 10/10/23  1714   WBC 4.85   HGB 11.0*   HCT 34.4*        CMP:   Recent Labs   Lab 10/10/23  1714   *   K 4.4      CO2 15*      BUN 78*   CREATININE 2.6*   CALCIUM 9.4   PROT 7.0   ALBUMIN 3.3*   BILITOT 0.6   ALKPHOS 104   AST 11   ALT 7*   ANIONGAP 13     Cardiac Markers:   Recent Labs   Lab 10/10/23  1714   BNP 86     Coagulation:   Recent Labs   Lab 10/10/23  1714   INR 1.0     Lactic Acid:   Recent Labs   Lab 10/10/23  1714   LACTATE 2.2     Troponin:   Recent Labs   Lab 10/10/23  1714   TROPONINI 0.011     Urine Studies:   Recent Labs   Lab 10/10/23  1903   COLORU Yellow   APPEARANCEUA Hazy*    PHUR 5.0   SPECGRAV 1.010   PROTEINUA Trace*   GLUCUA Negative   KETONESU Negative   BILIRUBINUA Negative   OCCULTUA Trace*   NITRITE Negative   UROBILINOGEN Negative   LEUKOCYTESUR 3+*   RBCUA 2   WBCUA >100*   BACTERIA Moderate*       Significant Imaging:   Imaging Results               X-Ray Chest AP Portable (Final result)  Result time 10/10/23 18:27:04      Final result by Da Martinez MD (10/10/23 18:27:04)                   Impression:      This report was flagged in Epic as abnormal.    1. Interstitial findings may reflect underlying COPD/emphysema.  No large focal consolidation.  2. There is prominence of the aortic arch, correlation with previous exams advised, PA and lateral could be performed for further evaluation as warranted.      Electronically signed by: Da Martinez MD  Date:    10/10/2023  Time:    18:27               Narrative:    EXAMINATION:  XR CHEST AP PORTABLE    CLINICAL HISTORY:  hypotension;    TECHNIQUE:  Single frontal view of the chest was performed.    COMPARISON:  None    FINDINGS:  The cardiomediastinal silhouette is not enlarged noting prominence of the aorta..  There is no pleural effusion.  The trachea is midline.  The lungs are symmetrically expanded bilaterally with coarse interstitial attenuation.  There are a few scattered calcified granuloma.  No large focal consolidation seen.  There is no pneumothorax.  The osseous structures are remarkable for degenerative change..                                       X-Ray Humerus 2 View Right (Final result)  Result time 10/10/23 18:05:56      Final result by Jade Rodriguez MD (10/10/23 18:05:56)                   Impression:      No acute findings.      Electronically signed by: Jade Rodriguez  Date:    10/10/2023  Time:    18:05               Narrative:    EXAMINATION:  XR HUMERUS 2 VIEW RIGHT    CLINICAL HISTORY:  Pain in arm, unspecified    TECHNIQUE:  Four views    COMPARISON:  None    FINDINGS:  Osteopenia.   No acute fracture or dislocation.  Mild glenohumeral and moderate acromioclavicular joint osteoarthritis.

## 2023-10-11 NOTE — PLAN OF CARE
[place] - Emergency Dept  Discharge Assessment    Primary Care Provider: Luis Miguel López MD on [facility] (This provider is not in EPIC)  Case Management Assessment     PCP: See above  Pharmacy: [facility] on [facility] or any other provider.  [place] and [place].    Patient Arrived From: home alone  Existing Help at Home: Paid sitters.  Patient has limited family support. Her  is  and she has no children.  Has been helped by neighbors and friends since shoulder injury and being discharged from [facility] ED about two weeks ago.    Barriers to Discharge: Limited/no family support    Discharge Plan:    A. SNF   B. Assisted Living      Discharge planning assessment completed with patient's assistance.  Patient is from home alone.  She is wheelchair bound but reported being independent with ADLs prior to a shoulder injury two weeks ago.  Patient reported that she was able to transfer, perform grooming and hygiene activities as well as cook and prepare her own meals.      Patient reported that she had been in contact with her PCP (Dr. Littlejohn) and PHN since the shoulder injury trying to get into a skilled nursing facility.  Patient reported that her insurance provider is in agreement with her receiving SNF services so that she might return to her PLOF.       Patient reported that her new PCP is Dr. Luis Miguel López ([facility] on [facility].  She stated that she saw Dr. López a couple of days ago. Patient uses a paid provider ([employer]) for transportation to and from doctor appointments.     Patient is hoping to discharge to SNF with the goal of returning to home post SNF.      Note: Patient's date of birth is incorrect in EPIC.  Her correct YOB: 1950.  Patient stated that she notified Registration of the error but was told that it could not be corrected because she didn't have her physical 's License with her.       Discharge  Assessment (most recent)       BRIEF DISCHARGE ASSESSMENT - 10/11/23 0922          Discharge Planning    Assessment Type Discharge Planning Brief Assessment     Resource/Environmental Concerns home accessibility     Support Systems Friends/neighbors   Had been depending on friends to help.  Hire a sitter service after shoulder injury but worker was not helpful.    Assistance Needed Since shoulder injury, has needed assistance with transfers and ADLs.  Prior to shoulder injury was active in the community and was able to perform IADLs and BADLs. Is wheelchair bound.     Equipment Currently Used at Home wheelchair     Current Living Arrangements home     Care Facility Name n/a     Patient/Family Anticipates Transition to long-term care facility   SNF    Patient/Family Anticipated Services at Transition skilled nursing   SNF to home.  Open facility within [facility].  Would prefer not to go to [facility].    DME Needed Upon Discharge  none     Discharge Plan A Skilled Nursing Facility     Discharge Plan B Assisted Living

## 2023-10-11 NOTE — HPI
Traci Blanca 72 y.o. female with history of CVA with residual left sided deficits, CKD3, history of PE no longer on anticoagulation presents to the hospital with a chief complaint of syncope.  She reports multiple syncopal episodes over the last week which she attributes to dehydration.  She reports she is been unable to eat for the last week as she was unable to care for her activities of daily since a fall and resultant right shoulder pain 2 weeks ago.  She reports she was examined at a previous hospital.  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  She denies fever chest pain shortness of breath nausea vomiting abdominal leg swelling melena hematuria hematemesis dizziness.  She reports she was unable to the tolerate Eliquis for previously diagnosed PE due to bleeding complications.    In the ED, chest x-ray with prominence of the aortic arch correlation creatinine 2.6 lactic acid negative troponin negative BNP negative.

## 2023-10-11 NOTE — ASSESSMENT & PLAN NOTE
Presents with Cr of 2.6, baseline of 1.9 to 2.1 suspect related to decreased PO intake as unable to eat due to baseline paralysis and right sided shoulder pain now limit ADLs  Started on IVF  Renal dose medications, avoid nephrotoxic drugs, monitor intake and output  Home telmisartan held        Patient with acute kidney injury/acute renal failure likely due to pre-renal azotemia due to dehydration WALT is currently stable. Baseline creatinine 1.9 to 2.1 - Labs reviewed- Renal function/electrolytes with CrCl cannot be calculated (Unknown ideal weight.). according to latest data. Monitor urine output and serial BMP and adjust therapy as needed. Avoid nephrotoxins and renally dose meds for GFR listed above.  Changed  IVF  to bicarb drip for worsening acidosis.  Check bladder  scan

## 2023-10-11 NOTE — PLAN OF CARE
Call placed to Tita with [facility] to advised of SNF request for pt.    PASRR completed and LOCET called in. TN to follow for 142 via email.

## 2023-10-11 NOTE — NURSING
Patient arrived to floor via stretcher. Sliding board with 3 assists to transfer patient from stretcher to bed. IVF infusing. Cardiac monitor in place. Wound care consult ordered. No complaints at this time.

## 2023-10-11 NOTE — PROGRESS NOTES
[facility] Medicine  Progress Note    Patient Name: Traci Blanca  MRN: [id number]  Patient Class: OP- Observation   Admission Date: 10/10/2023  Length of Stay: 0 days  Attending Physician: Mary Segura MD  Primary Care Provider: No primary care provider on file.        Subjective:     Principal Problem:Acute renal failure superimposed on stage 3 chronic kidney disease        HPI:  Traci Blanca 72 y.o. female with history of CVA with residual left sided deficits, CKD3, history of PE no longer on anticoagulation presents to the hospital with a chief complaint of syncope.  She reports multiple syncopal episodes over the last week which she attributes to dehydration.  She reports she is been unable to eat for the last week as she was unable to care for her activities of daily since a fall and resultant right shoulder pain 2 weeks ago.  She reports she was examined at a previous hospital.  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  She denies fever chest pain shortness of breath nausea vomiting abdominal leg swelling melena hematuria hematemesis dizziness.  She reports she was unable to the tolerate Eliquis for previously diagnosed PE due to bleeding complications.    In the ED, chest x-ray with prominence of the aortic arch correlation creatinine 2.6 lactic acid negative troponin negative BNP negative.      Overview/Hospital Course:  Traci Blanca 72 y.o. female with history of CVA with residual left sided deficits, CKD3, history of PE no longer on anticoagulation presents to the hospital with a chief complaint of syncope.  She reports multiple syncopal episodes over the last week which she attributes to dehydration.   Patient has ARF on CKD and is on IVF,changed  to bicarb for acidosis,check bladder scan  Started on Rocephin for UTI.  No fracture on imaging,  Consult wound care for sacral wounds,  PT,OT.      No past medical history on file.    No past surgical  history on file.    Review of patient's allergies indicates:  No Known Allergies    No current facility-administered medications on file prior to encounter.     Current Outpatient Medications on File Prior to Encounter   Medication Sig    cyanocobalamin (VITAMIN B-12) 100 MCG tablet Take 100 mcg by mouth once daily.    ergocalciferol (ERGOCALCIFEROL) 50,000 unit Cap Take 1 capsule by mouth Daily.    telmisartan (MICARDIS) 80 MG Tab Take 80 mg by mouth Daily.     Family History    None       Tobacco Use    Smoking status: Not on file    Smokeless tobacco: Not on file   Substance and Sexual Activity    Alcohol use: Not on file    Drug use: Not on file    Sexual activity: Not on file     Review of Systems   Constitutional:  Negative for chills and fever.   HENT:  Negative for nosebleeds and tinnitus.    Eyes:  Negative for photophobia and visual disturbance.   Respiratory:  Negative for shortness of breath and wheezing.    Cardiovascular:  Negative for chest pain, palpitations and leg swelling.   Gastrointestinal:  Negative for abdominal distention, nausea and vomiting.   Genitourinary:  Negative for dysuria, flank pain and hematuria.   Musculoskeletal:  Positive for arthralgias. Negative for gait problem and joint swelling.   Skin:  Negative for rash and wound.   Neurological:  Negative for seizures and syncope.     Objective:     Vital Signs (Most Recent):  Temp: 98 °F (36.7 °C) (10/10/23 2233)  Pulse: 94 (10/11/23 0828)  Resp: 18 (10/11/23 0828)  BP: 127/67 (10/11/23 0828)  SpO2: 97 % (10/11/23 0406) Vital Signs (24h Range):  Temp:  [97.7 °F (36.5 °C)-98 °F (36.7 °C)] 98 °F (36.7 °C)  Pulse:  [48-94] 94  Resp:  [14-24] 18  SpO2:  [95 %-100 %] 97 %  BP: ()/(40-75) 127/67        There is no height or weight on file to calculate BMI.     Physical Exam  Vitals and nursing note reviewed.   Constitutional:       General: She is not in acute distress.     Appearance: She is well-developed. She is not  diaphoretic.   HENT:      Head: Normocephalic and atraumatic.      Right Ear: External ear normal.      Left Ear: External ear normal.   Eyes:      General:         Right eye: No discharge.         Left eye: No discharge.      Conjunctiva/sclera: Conjunctivae normal.   Neck:      Thyroid: No thyromegaly.   Cardiovascular:      Rate and Rhythm: Normal rate and regular rhythm.      Heart sounds: No murmur heard.  Pulmonary:      Effort: Pulmonary effort is normal. No respiratory distress.      Breath sounds: Normal breath sounds.   Abdominal:      General: Bowel sounds are normal. There is no distension.      Palpations: Abdomen is soft. There is no mass.      Tenderness: There is no abdominal tenderness.   Musculoskeletal:         General: No deformity.      Cervical back: Normal range of motion and neck supple.      Right lower leg: No edema.      Left lower leg: No edema.   Skin:     General: Skin is warm and dry.   Neurological:      Mental Status: She is alert and oriented to person, place, and time.      Sensory: No sensory deficit.      Comments: Right sided upper and lower paralysis. Baseline per patient since previous CVA   Psychiatric:         Mood and Affect: Mood normal.         Behavior: Behavior normal.                Significant Labs: CBC:   Recent Labs   Lab 10/10/23  1714 10/11/23  0548   WBC 4.85 4.79   HGB 11.0* 12.1   HCT 34.4* 38.4    245       CMP:   Recent Labs   Lab 10/10/23  1714 10/11/23  0548   * 134*   K 4.4 4.2    108   CO2 15* 14*    88   BUN 78* 67*   CREATININE 2.6* 2.3*   CALCIUM 9.4 9.0   PROT 7.0 6.8   ALBUMIN 3.3* 3.1*   BILITOT 0.6 0.4   ALKPHOS 104 102   AST 11 13   ALT 7* 8*   ANIONGAP 13 12       Cardiac Markers:   Recent Labs   Lab 10/10/23  1714   BNP 86       Coagulation:   Recent Labs   Lab 10/10/23  1714   INR 1.0       Lactic Acid:   Recent Labs   Lab 10/10/23  1714   LACTATE 2.2       Troponin:   Recent Labs   Lab 10/10/23  1714   TROPONINI 0.011        Urine Studies:   Recent Labs   Lab 10/10/23  1903   COLORU Yellow   APPEARANCEUA Hazy*   PHUR 5.0   SPECGRAV 1.010   PROTEINUA Trace*   GLUCUA Negative   KETONESU Negative   BILIRUBINUA Negative   OCCULTUA Trace*   NITRITE Negative   UROBILINOGEN Negative   LEUKOCYTESUR 3+*   RBCUA 2   WBCUA >100*   BACTERIA Moderate*         Significant Imaging:   Imaging Results               CT Chest Abdomen Pelvis Without Contrast (XPD) (Final result)  Result time 10/10/23 22:45:58      Final result by Vangie Heard MD (10/10/23 22:45:58)                   Impression:      Aneurysmal dilatation of the ascending thoracic aorta and common iliac arteries.  Moderate atherosclerotic changes.  If concern for dissection, consider additional imaging.    3 mm left apical pulmonary nodule.  For a solid nodule <6 mm, Fleischner Society 2017 guidelines recommend no routine follow up for a low risk patient, or follow-up with non-contrast chest CT at 12 months in a high risk patient.    Indeterminate low-attenuation left adrenal lesion.    Colonic diverticulosis.    Air-fluid level in the urinary bladder.  Differential considerations may include attempted instrumentation with a Fitzgerald catheter, fistulous formation, or gas-forming organism.    This report was flagged in Epic as abnormal.      Electronically signed by: Vangie Heard  Date:    10/10/2023  Time:    22:45               Narrative:    EXAMINATION:  CT CHEST ABDOMEN PELVIS WITHOUT    CLINICAL HISTORY:  Evaluation of syncope.  Hypotension onset 2 weeks ago.    TECHNIQUE:  1.25 mm unenhanced axial images from the lung apices through the greater trochanters were performed.  Coronal and sagittal reformatted images were provided.  No IV contrast was given.    COMPARISON:  None.    FINDINGS:  In the chest, there is aneurysmal dilatation of the ascending thoracic aorta measuring up to 4.9 cm (series 2 axial image 69).  The thoracic aorta is tortuous.  There is moderate  atherosclerotic changes.  A 3 mm pulmonary nodule is seen at the left lung apex (series 4 axial image 98).    Within the limits of a noncontrast examination, in the abdomen, there is no aneurysmal dilatation of the tortuous abdominal aorta.  There are moderate atherosclerotic changes.  The liver, spleen, pancreas, kidneys, and right adrenal gland are unremarkable.  The gallbladder is surgically absent.  There is a low-attenuation lobular left adrenal lesion measuring 2.0 x 1.8 cm (series 2 axial image 118). There is no gross abdominal adenopathy or ascites.  Moderate atherosclerotic disease is present.    In the pelvis, the right common iliac artery measures up to 2 cm (series 2 axial image 175).  The left common iliac artery measures up to 1.7 cm (series 2 axial image 179).  There are no pelvic masses or adenopathy.  There is no free pelvic fluid.  There is colonic diverticulosis.  There is a calcified uterine fibroid.  A small air-fluid level is seen in the urinary bladder.  Marginal and bridging osteophytes are present.                                        X-Ray Chest AP Portable (Final result)  Result time 10/10/23 18:27:04      Final result by Da Martinez MD (10/10/23 18:27:04)                   Impression:      This report was flagged in Epic as abnormal.    1. Interstitial findings may reflect underlying COPD/emphysema.  No large focal consolidation.  2. There is prominence of the aortic arch, correlation with previous exams advised, PA and lateral could be performed for further evaluation as warranted.      Electronically signed by: Da Martinez MD  Date:    10/10/2023  Time:    18:27               Narrative:    EXAMINATION:  XR CHEST AP PORTABLE    CLINICAL HISTORY:  hypotension;    TECHNIQUE:  Single frontal view of the chest was performed.    COMPARISON:  None    FINDINGS:  The cardiomediastinal silhouette is not enlarged noting prominence of the aorta..  There is no pleural effusion.  The  trachea is midline.  The lungs are symmetrically expanded bilaterally with coarse interstitial attenuation.  There are a few scattered calcified granuloma.  No large focal consolidation seen.  There is no pneumothorax.  The osseous structures are remarkable for degenerative change..                                       X-Ray Humerus 2 View Right (Final result)  Result time 10/10/23 18:05:56      Final result by Jade Rodriguez MD (10/10/23 18:05:56)                   Impression:      No acute findings.      Electronically signed by: Jade Rodriguez  Date:    10/10/2023  Time:    18:05               Narrative:    EXAMINATION:  XR HUMERUS 2 VIEW RIGHT    CLINICAL HISTORY:  Pain in arm, unspecified    TECHNIQUE:  Four views    COMPARISON:  None    FINDINGS:  Osteopenia.  No acute fracture or dislocation.  Mild glenohumeral and moderate acromioclavicular joint osteoarthritis.                                          Assessment/Plan:      * Acute renal failure superimposed on stage 3 chronic kidney disease  Presents with Cr of 2.6, baseline of 1.9 to 2.1 suspect related to decreased PO intake as unable to eat due to baseline paralysis and right sided shoulder pain now limit ADLs  Started on IVF  Renal dose medications, avoid nephrotoxic drugs, monitor intake and output  Home telmisartan held        Patient with acute kidney injury/acute renal failure likely due to pre-renal azotemia due to dehydration WALT is currently stable. Baseline creatinine 1.9 to 2.1 - Labs reviewed- Renal function/electrolytes with CrCl cannot be calculated (Unknown ideal weight.). according to latest data. Monitor urine output and serial BMP and adjust therapy as needed. Avoid nephrotoxins and renally dose meds for GFR listed above.  Changed  IVF  to bicarb drip for worsening acidosis.  Check bladder  scan    Metabolic acidosis  Started on bicarb drip.      Urinary tract infection without hematuria  On rocephin.      History of CVA  "(cerebrovascular accident)  With residual left sided deficits. As above. PT/OT consulted    Syncope  Reports multiple syncopal episodes over the last week, suspect related dehydration as has been eating less due to inability to care for herself since fall with shoulder pain.   Troponin trend  Telemetry  Echo  Continue IVF given WALT  Will check CTA without contrast given mediastinal widening on CT  Has symmetric radial pulses     ADDENDUM: CT returned with 4.9cm aneurysm of ascending thoracic aorta similar to the 5.1cm seen on previous Echo and CT chest in care everywhere in 2022. Without chest pain, hypertension, and symetric radial pulses aortic dissection is unlikely. Per care everywhere "there is an aneurysm of the ascending aorta with a maximum axial dimension of 5.1 cm."    Shoulder pain  Has shoulder pain since fall over 2 weeks ago. X-ray without fractures  PT/OT consulted      VTE Risk Mitigation (From admission, onward)         Ordered     heparin (porcine) injection 5,000 Units  Every 8 hours         10/11/23 1010     IP VTE HIGH RISK PATIENT  Once         10/10/23 2012     Place sequential compression device  Until discontinued         10/10/23 2012     Place CALE hose  Until discontinued         10/10/23 2012                Discharge Planning   ANDREA:      Code Status: Full Code   Is the patient medically ready for discharge?:     Reason for patient still in hospital (select all that apply): Patient trending condition  Discharge Plan A: Skilled Nursing Facility                  Jane Leyva MD  Department of Hospital Medicine   [facility] [place] [facility]    "

## 2023-10-11 NOTE — TREATMENT PLAN
Recommendations    Monitor weights for follow up to determine needs  PO intake to be recorded in chart   Collaboration with medical providers    Goals: 1. Pt to meet % EEN/EPN by RD follow up  Nutrition Goal Status: new  Communication of RD Recs:  (POC)    Assessment and Plan    No nutrition dx at this time

## 2023-10-11 NOTE — PLAN OF CARE
Referral for SNF placement sent via Careport to the following facilities for review:  [facility]  [facility]   [facility]  [facility]  [place] to follow for response and PT/OT notes for pt.

## 2023-10-11 NOTE — PT/OT/SLP PROGRESS
Occupational Therapy      Patient Name:  Traci Blanca   MRN:  [id number]    Patient not seen today secondary to: hold d/t cardiology consult and echo ordered. D/w nurse, Isabelle. Will follow-up later as able.     10/11/2023

## 2023-10-11 NOTE — PHARMACY MED REC
"Admission Medication History     The home medication history was taken by Cielo Hernandez.    You may go to "Admission" then "Reconcile Home Medications" tabs to review and/or act upon these items.     The home medication list has been updated by the Pharmacy department.   Please read ALL comments highlighted in yellow.   Please address this information as you see fit.    Feel free to contact us if you have any questions or require assistance.      Medications listed below were obtained from: Patient/family and Analytic software- [employer]  (Not in a hospital admission)        Cielo Hernandez  [phone]                 .          "

## 2023-10-11 NOTE — PT/OT/SLP PROGRESS
Physical Therapy      Patient Name:  Traci Blanca   MRN:  [id number]    Patient not seen today secondary to Testing/imaging (xray/CT/MRI). Will follow-up .

## 2023-10-11 NOTE — ASSESSMENT & PLAN NOTE
"Reports multiple syncopal episodes over the last week, suspect related dehydration as has been eating less due to inability to care for herself since fall with shoulder pain.   Troponin trend  Telemetry  Echo  Continue IVF given WALT  Will check CTA without contrast given mediastinal widening on CT  Has symmetric radial pulses     ADDENDUM: CT returned with 4.9cm aneurysm of ascending thoracic aorta similar to the 5.1cm seen on previous Echo and CT chest in care everywhere in 2022. Without chest pain, hypertension, and symetric radial pulses aortic dissection is unlikely. Per care everywhere "there is an aneurysm of the ascending aorta with a maximum axial dimension of 5.1 cm."  "

## 2023-10-11 NOTE — HOSPITAL COURSE
Traci Blanca 72 y.o. female with history of CVA with residual left sided deficits, CKD3, history of PE no longer on anticoagulation presents to the hospital with a chief complaint of syncope.  She reports multiple syncopal episodes over the last week which she attributes to dehydration.   Patient has ARF on CKD and is on IVF,changed  to bicarb for acidosis - metabolic acidosis has resolved  Urine culture shows E.coli UTI - treated with  IV rocephin during hospitalization  Rt knee with significant swelling, not warm to touch - xray imaging shows significant effusion  Orthopedic surgery consulted - s/p rt knee aspiration - cultures was negative,likely arthritis.  Cardiology was consulted for bigeminy/trigeminy - Echo. Showed preserved EF.  PT/OT - recommend SNF and case management assisting in this  Will continue to monitor electrolytes and treat as needed  Patient was discharged SNF with PCP follow up.

## 2023-10-11 NOTE — NURSING
Monitor tech reports Sinus HR 77 with ventricular trigeminy, patient asymptomatic, asleep. Pt states she feels fine. Dr. Woody notified.

## 2023-10-11 NOTE — SUBJECTIVE & OBJECTIVE
No past medical history on file.    No past surgical history on file.    Review of patient's allergies indicates:  No Known Allergies    No current facility-administered medications on file prior to encounter.     Current Outpatient Medications on File Prior to Encounter   Medication Sig    cyanocobalamin (VITAMIN B-12) 100 MCG tablet Take 100 mcg by mouth once daily.    ergocalciferol (ERGOCALCIFEROL) 50,000 unit Cap Take 1 capsule by mouth Daily.    telmisartan (MICARDIS) 80 MG Tab Take 80 mg by mouth Daily.     Family History    None       Tobacco Use    Smoking status: Not on file    Smokeless tobacco: Not on file   Substance and Sexual Activity    Alcohol use: Not on file    Drug use: Not on file    Sexual activity: Not on file     Review of Systems   Constitutional:  Negative for chills and fever.   HENT:  Negative for nosebleeds and tinnitus.    Eyes:  Negative for photophobia and visual disturbance.   Respiratory:  Negative for shortness of breath and wheezing.    Cardiovascular:  Negative for chest pain, palpitations and leg swelling.   Gastrointestinal:  Negative for abdominal distention, nausea and vomiting.   Genitourinary:  Negative for dysuria, flank pain and hematuria.   Musculoskeletal:  Positive for arthralgias. Negative for gait problem and joint swelling.   Skin:  Negative for rash and wound.   Neurological:  Negative for seizures and syncope.     Objective:     Vital Signs (Most Recent):  Temp: 98 °F (36.7 °C) (10/10/23 2233)  Pulse: 94 (10/11/23 0828)  Resp: 18 (10/11/23 0828)  BP: 127/67 (10/11/23 0828)  SpO2: 97 % (10/11/23 0406) Vital Signs (24h Range):  Temp:  [97.7 °F (36.5 °C)-98 °F (36.7 °C)] 98 °F (36.7 °C)  Pulse:  [48-94] 94  Resp:  [14-24] 18  SpO2:  [95 %-100 %] 97 %  BP: ()/(40-75) 127/67        There is no height or weight on file to calculate BMI.     Physical Exam  Vitals and nursing note reviewed.   Constitutional:       General: She is not in acute distress.      Appearance: She is well-developed. She is not diaphoretic.   HENT:      Head: Normocephalic and atraumatic.      Right Ear: External ear normal.      Left Ear: External ear normal.   Eyes:      General:         Right eye: No discharge.         Left eye: No discharge.      Conjunctiva/sclera: Conjunctivae normal.   Neck:      Thyroid: No thyromegaly.   Cardiovascular:      Rate and Rhythm: Normal rate and regular rhythm.      Heart sounds: No murmur heard.  Pulmonary:      Effort: Pulmonary effort is normal. No respiratory distress.      Breath sounds: Normal breath sounds.   Abdominal:      General: Bowel sounds are normal. There is no distension.      Palpations: Abdomen is soft. There is no mass.      Tenderness: There is no abdominal tenderness.   Musculoskeletal:         General: No deformity.      Cervical back: Normal range of motion and neck supple.      Right lower leg: No edema.      Left lower leg: No edema.   Skin:     General: Skin is warm and dry.   Neurological:      Mental Status: She is alert and oriented to person, place, and time.      Sensory: No sensory deficit.      Comments: Right sided upper and lower paralysis. Baseline per patient since previous CVA   Psychiatric:         Mood and Affect: Mood normal.         Behavior: Behavior normal.                Significant Labs: CBC:   Recent Labs   Lab 10/10/23  1714 10/11/23  0548   WBC 4.85 4.79   HGB 11.0* 12.1   HCT 34.4* 38.4    245       CMP:   Recent Labs   Lab 10/10/23  1714 10/11/23  0548   * 134*   K 4.4 4.2    108   CO2 15* 14*    88   BUN 78* 67*   CREATININE 2.6* 2.3*   CALCIUM 9.4 9.0   PROT 7.0 6.8   ALBUMIN 3.3* 3.1*   BILITOT 0.6 0.4   ALKPHOS 104 102   AST 11 13   ALT 7* 8*   ANIONGAP 13 12       Cardiac Markers:   Recent Labs   Lab 10/10/23  1714   BNP 86       Coagulation:   Recent Labs   Lab 10/10/23  1714   INR 1.0       Lactic Acid:   Recent Labs   Lab 10/10/23  1714   LACTATE 2.2       Troponin:    Recent Labs   Lab 10/10/23  1714   TROPONINI 0.011       Urine Studies:   Recent Labs   Lab 10/10/23  1903   COLORU Yellow   APPEARANCEUA Hazy*   PHUR 5.0   SPECGRAV 1.010   PROTEINUA Trace*   GLUCUA Negative   KETONESU Negative   BILIRUBINUA Negative   OCCULTUA Trace*   NITRITE Negative   UROBILINOGEN Negative   LEUKOCYTESUR 3+*   RBCUA 2   WBCUA >100*   BACTERIA Moderate*         Significant Imaging:   Imaging Results               CT Chest Abdomen Pelvis Without Contrast (XPD) (Final result)  Result time 10/10/23 22:45:58      Final result by Vangie Heard MD (10/10/23 22:45:58)                   Impression:      Aneurysmal dilatation of the ascending thoracic aorta and common iliac arteries.  Moderate atherosclerotic changes.  If concern for dissection, consider additional imaging.    3 mm left apical pulmonary nodule.  For a solid nodule <6 mm, Fleischner Society 2017 guidelines recommend no routine follow up for a low risk patient, or follow-up with non-contrast chest CT at 12 months in a high risk patient.    Indeterminate low-attenuation left adrenal lesion.    Colonic diverticulosis.    Air-fluid level in the urinary bladder.  Differential considerations may include attempted instrumentation with a Fitzgerald catheter, fistulous formation, or gas-forming organism.    This report was flagged in Epic as abnormal.      Electronically signed by: Vangie Heard  Date:    10/10/2023  Time:    22:45               Narrative:    EXAMINATION:  CT CHEST ABDOMEN PELVIS WITHOUT    CLINICAL HISTORY:  Evaluation of syncope.  Hypotension onset 2 weeks ago.    TECHNIQUE:  1.25 mm unenhanced axial images from the lung apices through the greater trochanters were performed.  Coronal and sagittal reformatted images were provided.  No IV contrast was given.    COMPARISON:  None.    FINDINGS:  In the chest, there is aneurysmal dilatation of the ascending thoracic aorta measuring up to 4.9 cm (series 2 axial image 69).  The  thoracic aorta is tortuous.  There is moderate atherosclerotic changes.  A 3 mm pulmonary nodule is seen at the left lung apex (series 4 axial image 98).    Within the limits of a noncontrast examination, in the abdomen, there is no aneurysmal dilatation of the tortuous abdominal aorta.  There are moderate atherosclerotic changes.  The liver, spleen, pancreas, kidneys, and right adrenal gland are unremarkable.  The gallbladder is surgically absent.  There is a low-attenuation lobular left adrenal lesion measuring 2.0 x 1.8 cm (series 2 axial image 118). There is no gross abdominal adenopathy or ascites.  Moderate atherosclerotic disease is present.    In the pelvis, the right common iliac artery measures up to 2 cm (series 2 axial image 175).  The left common iliac artery measures up to 1.7 cm (series 2 axial image 179).  There are no pelvic masses or adenopathy.  There is no free pelvic fluid.  There is colonic diverticulosis.  There is a calcified uterine fibroid.  A small air-fluid level is seen in the urinary bladder.  Marginal and bridging osteophytes are present.                                        X-Ray Chest AP Portable (Final result)  Result time 10/10/23 18:27:04      Final result by Da Martinez MD (10/10/23 18:27:04)                   Impression:      This report was flagged in Epic as abnormal.    1. Interstitial findings may reflect underlying COPD/emphysema.  No large focal consolidation.  2. There is prominence of the aortic arch, correlation with previous exams advised, PA and lateral could be performed for further evaluation as warranted.      Electronically signed by: Da Martinez MD  Date:    10/10/2023  Time:    18:27               Narrative:    EXAMINATION:  XR CHEST AP PORTABLE    CLINICAL HISTORY:  hypotension;    TECHNIQUE:  Single frontal view of the chest was performed.    COMPARISON:  None    FINDINGS:  The cardiomediastinal silhouette is not enlarged noting prominence of the  aorta..  There is no pleural effusion.  The trachea is midline.  The lungs are symmetrically expanded bilaterally with coarse interstitial attenuation.  There are a few scattered calcified granuloma.  No large focal consolidation seen.  There is no pneumothorax.  The osseous structures are remarkable for degenerative change..                                       X-Ray Humerus 2 View Right (Final result)  Result time 10/10/23 18:05:56      Final result by Jade Rodriguez MD (10/10/23 18:05:56)                   Impression:      No acute findings.      Electronically signed by: Jade Rodriguez  Date:    10/10/2023  Time:    18:05               Narrative:    EXAMINATION:  XR HUMERUS 2 VIEW RIGHT    CLINICAL HISTORY:  Pain in arm, unspecified    TECHNIQUE:  Four views    COMPARISON:  None    FINDINGS:  Osteopenia.  No acute fracture or dislocation.  Mild glenohumeral and moderate acromioclavicular joint osteoarthritis.

## 2023-10-11 NOTE — PROGRESS NOTES
[facility] - [facility] Surg  Adult Nutrition  Consult Note    SUMMARY     Recommendations    Monitor weights/measurements to be in for follow up to determine needs  PO intake to be recorded in chart   Collaboration with medical providers    Goals: 1. Pt to meet % EEN/EPN by RD follow up  Nutrition Goal Status: new  Communication of RD Recs:  (POC)    Assessment and Plan    No nutrition dx at this time    Reason for Assessment    Reason For Assessment: consult  Diagnosis: renal disease  Relevant Medical History: No past medical history on file.   Interdisciplinary Rounds: did not attend  Nutrition Discharge Planning: pending medical course    Nutrition Risk Screen    Nutrition Risk Screen: reduced oral intake over the last month    Nutrition/Diet History    Food Allergies: NKFA    Anthropometrics    Temp: 98 °F (36.7 °C)       Lab/Procedures/Meds    Pertinent Labs Reviewed: reviewed  BMP  Lab Results   Component Value Date     (L) 10/11/2023    K 4.2 10/11/2023     10/11/2023    CO2 14 (L) 10/11/2023    BUN 67 (H) 10/11/2023    CREATININE 2.3 (H) 10/11/2023    CALCIUM 9.0 10/11/2023    ANIONGAP 12 10/11/2023    EGFRNORACEVR 22 (A) 10/11/2023      Pertinent Medications Reviewed: reviewed  Current Outpatient Medications   Medication Instructions    cyanocobalamin (VITAMIN B-12) 100 mcg, Oral, Daily    ergocalciferol (ERGOCALCIFEROL) 50,000 unit Cap 1 capsule, Oral, Daily    telmisartan (MICARDIS) 80 mg, Oral, Daily        Nutrition Prescription Ordered    Current Diet Order: Dysphagia Soft    Evaluation of Received Nutrient/Fluid Intake    I/O: +100mL  Comments: lbm 10/11/23  % Intake of Estimated Energy Needs: unknown  % Meal Intake: Other: unknown    Nutrition Risk    Level of Risk/Frequency of Follow-up: moderate       Monitor and Evaluation    Food and Nutrient Intake: food and beverage intake  Food and Nutrient Adminstration: diet order  Knowledge/Beliefs/Attitudes: food and nutrition knowledge/skill,  beliefs and attitudes  Physical Activity and Function: nutrition-related ADLs and IADLs, factors affecting access to physical activity  Anthropometric Measurements: weight change, weight, body mass index  Biochemical Data, Medical Tests and Procedures: electrolyte and renal panel  Nutrition-Focused Physical Findings: overall appearance       Nutrition Follow-Up    RD Follow-up?: Yes    Maria M López, Registration Eligible, Provisional LDN

## 2023-10-11 NOTE — ED NOTES
[facility], [facility] - [place]  Nurses Note -- 4 Eyes      10/11/2023       Skin assessed on: Q Shift      [] No Pressure Injuries Present    []Prevention Measures Documented    [x] Yes LDA  for Pressure Injury Previously documented     [] Yes New Pressure Injury Discovered   [] LDA for New Pressure Injury Added      Attending RN:  Levi Mosley RN     Second RN:  Amanda NERI

## 2023-10-11 NOTE — ED NOTES
"Pt presents to the ED via EMS for  a near syncopal episode. Pt reports feeling lightheaded and dizzy and having a near syncopal episode. Pt states she has had similar episodes of near syncopal episodes but "gets discharged from the hospital even though I can't take care of myself and I have told them that. " Pt states she is unable to perform her activities of daily living and relies on neighbors and friends to help feed her or change her. 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 Pt is not ambulatory and uses a wheelchair. Pt also reports a sacral wound that causes severe pain. Sacral region endorses non-blanchable erythema but skin is intact. Pt appears emotional and tearful asking to be admitted. Pt reports fecal and urinary incontinence.   "

## 2023-10-11 NOTE — PLAN OF CARE
SNF Follow Up:    Willing to Accept:  [facility]     Interested:  Shayna Dooley    No Available Bed:  [facility]    Can't Meet Needs:  [place]

## 2023-10-11 NOTE — ASSESSMENT & PLAN NOTE
Presents with Cr of 2.6, baseline of 1.9 to 2.1 suspect related to decreased PO intake as unable to eat due to baseline paralysis and right sided shoulder pain now limit ADLs  Started on IVF  Renal dose medications, avoid nephrotoxic drugs, monitor intake and output  Home telmisartan held        Patient with acute kidney injury/acute renal failure likely due to pre-renal azotemia due to dehydration WALT is currently stable. Baseline creatinine 1.9 to 2.1 - Labs reviewed- Renal function/electrolytes with CrCl cannot be calculated (Unknown ideal weight.). according to latest data. Monitor urine output and serial BMP and adjust therapy as needed. Avoid nephrotoxins and renally dose meds for GFR listed above.

## 2023-10-11 NOTE — ASSESSMENT & PLAN NOTE
Reports multiple syncopal episodes over the last week, suspect related dehydration as has been eating less due to inability to care for herself since fall with shoulder pain.   Troponin trend  Telemetry  Echo  Continue IVF given WALT  Will check CTA without contrast given mediastinal widening on CT

## 2023-10-11 NOTE — H&P
[facility] Emergency [facility] Medicine  History & Physical    Patient Name: Traci Blanca  MRN: [id number]  Patient Class: OP- Observation  Admission Date: 10/10/2023  Attending Physician: Mary Segura MD   Primary Care Provider: No primary care provider on file.         Patient information was obtained from patient, past medical records and ER records.     Subjective:     Principal Problem:Acute renal failure superimposed on stage 3 chronic kidney disease    Chief Complaint:   Chief Complaint   Patient presents with    Dizziness     Pt presents to the ED via NO EMS  PT has complaints of intermittent dizziness, fecal / urinary incontinence, non healing wound to buttocks and diarrhea  Per EMS the patient was hypotensive upon arrival in 70/44  EMS administered 1 liter of LR pts /70 at triage  Pt has left sided paralysis (pt leaves at kesha alone )  Pt Alert at traige        HPI: Traci Blanca 72 y.o. female with history of CVA with residual left sided deficits, CKD3, history of PE no longer on anticoagulation presents to the hospital with a chief complaint of syncope.  She reports multiple syncopal episodes over the last week which she attributes to dehydration.  She reports she is been unable to eat for the last week as she was unable to care for her activities of daily since a fall and resultant right shoulder pain 2 weeks ago.  She reports she was examined at a previous hospital.  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  She denies fever chest pain shortness of breath nausea vomiting abdominal leg swelling melena hematuria hematemesis dizziness.  She reports she was unable to the tolerate Eliquis for previously diagnosed PE due to bleeding complications.    In the ED, chest x-ray with prominence of the aortic arch correlation creatinine 2.6 lactic acid negative troponin negative BNP negative.      No past medical history on file.    No past surgical history on  file.    Review of patient's allergies indicates:  No Known Allergies    No current facility-administered medications on file prior to encounter.     Current Outpatient Medications on File Prior to Encounter   Medication Sig    cyanocobalamin (VITAMIN B-12) 100 MCG tablet Take 100 mcg by mouth once daily.    ergocalciferol (ERGOCALCIFEROL) 50,000 unit Cap Take 1 capsule by mouth Daily.    telmisartan (MICARDIS) 80 MG Tab Take 80 mg by mouth Daily.     Family History    None       Tobacco Use    Smoking status: Not on file    Smokeless tobacco: Not on file   Substance and Sexual Activity    Alcohol use: Not on file    Drug use: Not on file    Sexual activity: Not on file     Review of Systems   Constitutional:  Negative for chills and fever.   HENT:  Negative for nosebleeds and tinnitus.    Eyes:  Negative for photophobia and visual disturbance.   Respiratory:  Negative for shortness of breath and wheezing.    Cardiovascular:  Negative for chest pain, palpitations and leg swelling.   Gastrointestinal:  Negative for abdominal distention, nausea and vomiting.   Genitourinary:  Negative for dysuria, flank pain and hematuria.   Musculoskeletal:  Positive for arthralgias. Negative for gait problem and joint swelling.   Skin:  Negative for rash and wound.   Neurological:  Negative for seizures and syncope.     Objective:     Vital Signs (Most Recent):  Temp: 97.7 °F (36.5 °C) (10/10/23 1600)  Pulse: 65 (10/10/23 1833)  Resp: 16 (10/10/23 1731)  BP: 134/62 (10/10/23 1832)  SpO2: 97 % (10/10/23 1832) Vital Signs (24h Range):  Temp:  [97.7 °F (36.5 °C)] 97.7 °F (36.5 °C)  Pulse:  [65-89] 65  Resp:  [16-20] 16  SpO2:  [95 %-99 %] 97 %  BP: ()/(62-75) 134/62        There is no height or weight on file to calculate BMI.     Physical Exam  Vitals and nursing note reviewed.   Constitutional:       General: She is not in acute distress.     Appearance: She is well-developed. She is not diaphoretic.   HENT:      Head:  Normocephalic and atraumatic.      Right Ear: External ear normal.      Left Ear: External ear normal.   Eyes:      General:         Right eye: No discharge.         Left eye: No discharge.      Conjunctiva/sclera: Conjunctivae normal.   Neck:      Thyroid: No thyromegaly.   Cardiovascular:      Rate and Rhythm: Normal rate and regular rhythm.      Heart sounds: No murmur heard.  Pulmonary:      Effort: Pulmonary effort is normal. No respiratory distress.      Breath sounds: Normal breath sounds.   Abdominal:      General: Bowel sounds are normal. There is no distension.      Palpations: Abdomen is soft. There is no mass.      Tenderness: There is no abdominal tenderness.   Musculoskeletal:         General: No deformity.      Cervical back: Normal range of motion and neck supple.      Right lower leg: No edema.      Left lower leg: No edema.   Skin:     General: Skin is warm and dry.   Neurological:      Mental Status: She is alert and oriented to person, place, and time.      Sensory: No sensory deficit.      Comments: Right sided upper and lower paralysis. Baseline per patient since previous CVA   Psychiatric:         Mood and Affect: Mood normal.         Behavior: Behavior normal.                Significant Labs: CBC:   Recent Labs   Lab 10/10/23  1714   WBC 4.85   HGB 11.0*   HCT 34.4*        CMP:   Recent Labs   Lab 10/10/23  1714   *   K 4.4      CO2 15*      BUN 78*   CREATININE 2.6*   CALCIUM 9.4   PROT 7.0   ALBUMIN 3.3*   BILITOT 0.6   ALKPHOS 104   AST 11   ALT 7*   ANIONGAP 13     Cardiac Markers:   Recent Labs   Lab 10/10/23  1714   BNP 86     Coagulation:   Recent Labs   Lab 10/10/23  1714   INR 1.0     Lactic Acid:   Recent Labs   Lab 10/10/23  1714   LACTATE 2.2     Troponin:   Recent Labs   Lab 10/10/23  1714   TROPONINI 0.011     Urine Studies:   Recent Labs   Lab 10/10/23  1903   COLORU Yellow   APPEARANCEUA Hazy*   PHUR 5.0   SPECGRAV 1.010   PROTEINUA Trace*   GLUCUA  Negative   KETONESU Negative   BILIRUBINUA Negative   OCCULTUA Trace*   NITRITE Negative   UROBILINOGEN Negative   LEUKOCYTESUR 3+*   RBCUA 2   WBCUA >100*   BACTERIA Moderate*       Significant Imaging:   Imaging Results               X-Ray Chest AP Portable (Final result)  Result time 10/10/23 18:27:04      Final result by Da Martinez MD (10/10/23 18:27:04)                   Impression:      This report was flagged in Epic as abnormal.    1. Interstitial findings may reflect underlying COPD/emphysema.  No large focal consolidation.  2. There is prominence of the aortic arch, correlation with previous exams advised, PA and lateral could be performed for further evaluation as warranted.      Electronically signed by: Da Martinez MD  Date:    10/10/2023  Time:    18:27               Narrative:    EXAMINATION:  XR CHEST AP PORTABLE    CLINICAL HISTORY:  hypotension;    TECHNIQUE:  Single frontal view of the chest was performed.    COMPARISON:  None    FINDINGS:  The cardiomediastinal silhouette is not enlarged noting prominence of the aorta..  There is no pleural effusion.  The trachea is midline.  The lungs are symmetrically expanded bilaterally with coarse interstitial attenuation.  There are a few scattered calcified granuloma.  No large focal consolidation seen.  There is no pneumothorax.  The osseous structures are remarkable for degenerative change..                                       X-Ray Humerus 2 View Right (Final result)  Result time 10/10/23 18:05:56      Final result by Jade Rodriguez MD (10/10/23 18:05:56)                   Impression:      No acute findings.      Electronically signed by: Jade Rodriguez  Date:    10/10/2023  Time:    18:05               Narrative:    EXAMINATION:  XR HUMERUS 2 VIEW RIGHT    CLINICAL HISTORY:  Pain in arm, unspecified    TECHNIQUE:  Four views    COMPARISON:  None    FINDINGS:  Osteopenia.  No acute fracture or dislocation.  Mild glenohumeral  "and moderate acromioclavicular joint osteoarthritis.                                        Assessment/Plan:     * Acute renal failure superimposed on stage 3 chronic kidney disease  Presents with Cr of 2.6, baseline of 1.9 to 2.1 suspect related to decreased PO intake as unable to eat due to baseline paralysis and right sided shoulder pain now limit ADLs  Started on IVF  Renal dose medications, avoid nephrotoxic drugs, monitor intake and output  Home telmisartan held        Patient with acute kidney injury/acute renal failure likely due to pre-renal azotemia due to dehydration WALT is currently stable. Baseline creatinine 1.9 to 2.1 - Labs reviewed- Renal function/electrolytes with CrCl cannot be calculated (Unknown ideal weight.). according to latest data. Monitor urine output and serial BMP and adjust therapy as needed. Avoid nephrotoxins and renally dose meds for GFR listed above.    History of CVA (cerebrovascular accident)  With residual left sided deficits. As above. PT/OT consulted    Syncope  Reports multiple syncopal episodes over the last week, suspect related dehydration as has been eating less due to inability to care for herself since fall with shoulder pain.   Troponin trend  Telemetry  Echo  Continue IVF given WALT  Will check CTA without contrast given mediastinal widening on CT  Has symmetric radial pulses     ADDENDUM: CT returned with 4.9cm aneurysm of ascending thoracic aorta similar to the 5.1cm seen on previous Echo and CT chest in care everywhere in 2022. Without chest pain, hypertension, and symetric radial pulses aortic dissection is unlikely. Per care everywhere "there is an aneurysm of the ascending aorta with a maximum axial dimension of 5.1 cm."    Shoulder pain  Has shoulder pain since fall over 2 weeks ago. X-ray without fractures  PT/OT consulted      VTE Risk Mitigation (From admission, onward)           Ordered     IP VTE HIGH RISK PATIENT  Once         10/10/23 2012     Place " sequential compression device  Until discontinued         10/10/23 2012     Place CALE hose  Until discontinued         10/10/23 2012                     Discussed with ED physician.    VTE: CALE/SCD  Code: Full  Diet: renal  Dispo: pending PT OT and echo with improvement in WALT  On 10/10/2023, patient should be placed in hospital observation services under my care in collaboration with Mary Segura MD.      Aubrey Hargrove PA-C  Department of Hospital Medicine  [facility] [place]

## 2023-10-11 NOTE — NURSING
"Patient HR 46. Dr. Woody notified, states to monitor patient.     Patient states she is having trouble "getting the food down". Pt tolerated broth and states she would be able tolerate a full liquid diet better. Dr. Woody notified, new orders given.  "

## 2023-10-11 NOTE — CONSULTS
[facility] Surg  Wound Care    Patient Name:  Traci Blanca   MRN:  [id number]  Date: 10/11/2023  Diagnosis: Acute renal failure superimposed on stage 3 chronic kidney disease    History:     No past medical history on file.    Social History     Socioeconomic History    Marital status:        Precautions:     Allergies as of 10/10/2023    (No Known Allergies)       WO Assessment Details/Treatment   Consulted for sacral wound  A 72 year old female admitted to OBS 10/10/23 from home with acute renal failure superimposed on Stage 3 CKD; history CVA; syncope; shoulder pain  10/11 WBC 4.79 Hgb 12.1 Hct 38.4 Alb 3.1 Weight 160 lbs  On Isoflex mattress; Alexander score 10; wheelchair bound; incontinent; assistance needed with self care since shoulder injury  10/11 7:16 am 4 Eyes Skin Assessment- Pressure Injury Previously documented  Assessment:  Patient sitting up in bed asleep. Sleeping soundly. Did not wake up with calling name.  Wheelchair with Ole cushion at bedside  Nursing to follow WOGs and PIP  Plan:  Assess sacrum tomorrow.    10/11/2023 placed.

## 2023-10-11 NOTE — PLAN OF CARE
Contacts:  Marlena Azuld (Friend):  [phone]  Friend:  Apolinar & wife:  [phone]    Above information obtained from Ms. Pringle.  Per Ms. Pringle, patient does not have family she and other friends assist patient in the home.

## 2023-10-12 PROBLEM — E83.42 HYPOMAGNESEMIA: Status: ACTIVE | Noted: 2023-10-12

## 2023-10-12 LAB
ANION GAP SERPL CALC-SCNC: 8 MMOL/L (ref 8–16)
BACTERIA UR CULT: ABNORMAL
BASOPHILS # BLD AUTO: 0.02 K/UL (ref 0–0.2)
BASOPHILS NFR BLD: 0.5 % (ref 0–1.9)
BUN SERPL-MCNC: 51 MG/DL (ref 8–23)
CALCIUM SERPL-MCNC: 8.4 MG/DL (ref 8.7–10.5)
CHLORIDE SERPL-SCNC: 104 MMOL/L (ref 95–110)
CO2 SERPL-SCNC: 24 MMOL/L (ref 23–29)
CREAT SERPL-MCNC: 1.8 MG/DL (ref 0.5–1.4)
DIFFERENTIAL METHOD: ABNORMAL
EOSINOPHIL # BLD AUTO: 0.1 K/UL (ref 0–0.5)
EOSINOPHIL NFR BLD: 1.6 % (ref 0–8)
ERYTHROCYTE [DISTWIDTH] IN BLOOD BY AUTOMATED COUNT: 12.3 % (ref 11.5–14.5)
EST. GFR  (NO RACE VARIABLE): 30 ML/MIN/1.73 M^2
GLUCOSE SERPL-MCNC: 116 MG/DL (ref 70–110)
HCT VFR BLD AUTO: 31.5 % (ref 37–48.5)
HGB BLD-MCNC: 10 G/DL (ref 12–16)
IMM GRANULOCYTES # BLD AUTO: 0.01 K/UL (ref 0–0.04)
IMM GRANULOCYTES NFR BLD AUTO: 0.2 % (ref 0–0.5)
LYMPHOCYTES # BLD AUTO: 0.8 K/UL (ref 1–4.8)
LYMPHOCYTES NFR BLD: 17.4 % (ref 18–48)
MAGNESIUM SERPL-MCNC: 1.5 MG/DL (ref 1.6–2.6)
MCH RBC QN AUTO: 30.4 PG (ref 27–31)
MCHC RBC AUTO-ENTMCNC: 31.7 G/DL (ref 32–36)
MCV RBC AUTO: 96 FL (ref 82–98)
MONOCYTES # BLD AUTO: 0.2 K/UL (ref 0.3–1)
MONOCYTES NFR BLD: 5 % (ref 4–15)
NEUTROPHILS # BLD AUTO: 3.3 K/UL (ref 1.8–7.7)
NEUTROPHILS NFR BLD: 75.3 % (ref 38–73)
NRBC BLD-RTO: 0 /100 WBC
PLATELET # BLD AUTO: ABNORMAL K/UL (ref 150–450)
PMV BLD AUTO: ABNORMAL FL (ref 9.2–12.9)
POTASSIUM SERPL-SCNC: 4.5 MMOL/L (ref 3.5–5.1)
RBC # BLD AUTO: 3.29 M/UL (ref 4–5.4)
SODIUM SERPL-SCNC: 136 MMOL/L (ref 136–145)
WBC # BLD AUTO: 4.38 K/UL (ref 3.9–12.7)

## 2023-10-12 PROCEDURE — 97542 WHEELCHAIR MNGMENT TRAINING: CPT

## 2023-10-12 PROCEDURE — 97535 SELF CARE MNGMENT TRAINING: CPT

## 2023-10-12 PROCEDURE — 99213 PR OFFICE/OUTPT VISIT, EST, LEVL III, 20-29 MIN: ICD-10-PCS | Mod: ,,, | Performed by: INTERNAL MEDICINE

## 2023-10-12 PROCEDURE — 97530 THERAPEUTIC ACTIVITIES: CPT | Mod: 59

## 2023-10-12 PROCEDURE — 36415 COLL VENOUS BLD VENIPUNCTURE: CPT | Performed by: HOSPITALIST

## 2023-10-12 PROCEDURE — 92526 ORAL FUNCTION THERAPY: CPT

## 2023-10-12 PROCEDURE — 80048 BASIC METABOLIC PNL TOTAL CA: CPT | Performed by: HOSPITALIST

## 2023-10-12 PROCEDURE — 97165 OT EVAL LOW COMPLEX 30 MIN: CPT

## 2023-10-12 PROCEDURE — 85025 COMPLETE CBC W/AUTO DIFF WBC: CPT | Performed by: HOSPITALIST

## 2023-10-12 PROCEDURE — 83735 ASSAY OF MAGNESIUM: CPT | Performed by: HOSPITALIST

## 2023-10-12 PROCEDURE — 25000003 PHARM REV CODE 250: Performed by: HOSPITALIST

## 2023-10-12 PROCEDURE — 96366 THER/PROPH/DIAG IV INF ADDON: CPT

## 2023-10-12 PROCEDURE — 63600175 PHARM REV CODE 636 W HCPCS: Performed by: INTERNAL MEDICINE

## 2023-10-12 PROCEDURE — G0378 HOSPITAL OBSERVATION PER HR: HCPCS

## 2023-10-12 PROCEDURE — 92610 EVALUATE SWALLOWING FUNCTION: CPT

## 2023-10-12 PROCEDURE — 63600175 PHARM REV CODE 636 W HCPCS: Performed by: HOSPITALIST

## 2023-10-12 PROCEDURE — 99213 OFFICE O/P EST LOW 20 MIN: CPT | Mod: ,,, | Performed by: INTERNAL MEDICINE

## 2023-10-12 PROCEDURE — 97161 PT EVAL LOW COMPLEX 20 MIN: CPT

## 2023-10-12 PROCEDURE — 96368 THER/DIAG CONCURRENT INF: CPT

## 2023-10-12 PROCEDURE — 25000003 PHARM REV CODE 250: Performed by: PHYSICIAN ASSISTANT

## 2023-10-12 RX ORDER — MAGNESIUM SULFATE 1 G/100ML
1 INJECTION INTRAVENOUS ONCE
Status: DISCONTINUED | OUTPATIENT
Start: 2023-10-12 | End: 2023-10-12

## 2023-10-12 RX ORDER — SODIUM CHLORIDE 9 MG/ML
INJECTION, SOLUTION INTRAVENOUS CONTINUOUS
Status: DISCONTINUED | OUTPATIENT
Start: 2023-10-12 | End: 2023-10-17 | Stop reason: HOSPADM

## 2023-10-12 RX ORDER — MAGNESIUM SULFATE 1 G/100ML
1 INJECTION INTRAVENOUS ONCE
Status: COMPLETED | OUTPATIENT
Start: 2023-10-12 | End: 2023-10-12

## 2023-10-12 RX ORDER — REGADENOSON 0.08 MG/ML
0.4 INJECTION, SOLUTION INTRAVENOUS ONCE
Status: DISCONTINUED | OUTPATIENT
Start: 2023-10-12 | End: 2023-10-17 | Stop reason: HOSPADM

## 2023-10-12 RX ORDER — ACETAMINOPHEN 325 MG/1
650 TABLET ORAL EVERY 6 HOURS PRN
Status: DISCONTINUED | OUTPATIENT
Start: 2023-10-12 | End: 2023-10-17 | Stop reason: HOSPADM

## 2023-10-12 RX ADMIN — SODIUM CHLORIDE: 9 INJECTION, SOLUTION INTRAVENOUS at 01:10

## 2023-10-12 RX ADMIN — CEFTRIAXONE 1 G: 1 INJECTION, POWDER, FOR SOLUTION INTRAMUSCULAR; INTRAVENOUS at 11:10

## 2023-10-12 RX ADMIN — SODIUM CHLORIDE: 9 INJECTION, SOLUTION INTRAVENOUS at 11:10

## 2023-10-12 RX ADMIN — MAGNESIUM SULFATE 1 G: 1 INJECTION INTRAVENOUS at 01:10

## 2023-10-12 RX ADMIN — SODIUM BICARBONATE: 84 INJECTION, SOLUTION INTRAVENOUS at 03:10

## 2023-10-12 RX ADMIN — ACETAMINOPHEN 650 MG: 325 TABLET ORAL at 07:10

## 2023-10-12 RX ADMIN — ACETAMINOPHEN 650 MG: 325 TABLET ORAL at 02:10

## 2023-10-12 RX ADMIN — ACETAMINOPHEN 650 MG: 325 TABLET ORAL at 06:10

## 2023-10-12 NOTE — PROGRESS NOTES
[facility] Surg  Cardiology  Progress Note    Patient Name: Traci Blanca  MRN: [id number]  Admission Date: 10/10/2023  Hospital Length of Stay: 0 days  Code Status: Full Code   Attending Physician: Jane Leyva, *   Primary Care Physician: Tsering, Primary Doctor  Expected Discharge Date:   Principal Problem:Acute renal failure superimposed on stage 3 chronic kidney disease    Subjective:       Interval History:  Stress test today    No past medical history on file.    No past surgical history on file.    Review of patient's allergies indicates:  No Known Allergies    No current facility-administered medications on file prior to encounter.     Current Outpatient Medications on File Prior to Encounter   Medication Sig    cyanocobalamin (VITAMIN B-12) 100 MCG tablet Take 100 mcg by mouth once daily.    ergocalciferol (ERGOCALCIFEROL) 50,000 unit Cap Take 1 capsule by mouth Daily.    telmisartan (MICARDIS) 80 MG Tab Take 80 mg by mouth Daily.     Family History    None       Tobacco Use    Smoking status: Not on file    Smokeless tobacco: Not on file   Substance and Sexual Activity    Alcohol use: Not on file    Drug use: Not on file    Sexual activity: Not on file     Review of Systems   Constitutional: Positive for malaise/fatigue.   HENT: Negative.     Eyes: Negative.    Cardiovascular:  Positive for near-syncope and syncope.   Respiratory: Negative.     Endocrine: Negative.    Hematologic/Lymphatic: Negative.    Skin: Negative.    Musculoskeletal: Negative.    Gastrointestinal: Negative.    Genitourinary: Negative.    Psychiatric/Behavioral: Negative.     Allergic/Immunologic: Negative.      Objective:     Vital Signs (Most Recent):  Temp: 97.5 °F (36.4 °C) (10/12/23 1129)  Pulse: 86 (10/12/23 1129)  Resp: 20 (10/12/23 1129)  BP: 104/64 (10/12/23 1129)  SpO2: 96 % (10/12/23 1129) Vital Signs (24h Range):  Temp:  [97.4 °F (36.3 °C)-98.6 °F (37 °C)] 97.5 °F (36.4 °C)  Pulse:  [42-89] 86  Resp:   [18-20] 20  SpO2:  [92 %-98 %] 96 %  BP: (104-126)/(56-66) 104/64     Weight: 72.7 kg (160 lb 5.1 oz)  Body mass index is 23 kg/m².    SpO2: 96 %         Intake/Output Summary (Last 24 hours) at 10/12/2023 1227  Last data filed at 10/12/2023 1127  Gross per 24 hour   Intake 2706.44 ml   Output 1100 ml   Net 1606.44 ml         Lines/Drains/Airways       Peripheral Intravenous Line  Duration                  Peripheral IV - Single Lumen 10/11/23 0941 22 G Posterior;Right Hand 1 day                     Physical Exam  Constitutional:       Appearance: Normal appearance. She is well-developed.   HENT:      Head: Normocephalic.   Eyes:      Pupils: Pupils are equal, round, and reactive to light.   Cardiovascular:      Rate and Rhythm: Normal rate and regular rhythm. Extrasystoles are present.  Pulmonary:      Effort: Pulmonary effort is normal.      Breath sounds: Normal breath sounds.   Abdominal:      General: Bowel sounds are normal.      Palpations: Abdomen is soft.      Tenderness: There is no abdominal tenderness.   Musculoskeletal:         General: Normal range of motion.      Cervical back: Normal range of motion and neck supple.   Skin:     General: Skin is warm.   Neurological:      Mental Status: She is alert and oriented to person, place, and time.          Significant Labs: BMP:   Recent Labs   Lab 10/10/23  1714 10/11/23  0548 10/12/23  0523    88 116*   * 134* 136   K 4.4 4.2 4.5    108 104   CO2 15* 14* 24   BUN 78* 67* 51*   CREATININE 2.6* 2.3* 1.8*   CALCIUM 9.4 9.0 8.4*   MG 1.8  --  1.5*     , CMP   Recent Labs   Lab 10/10/23  1714 10/11/23  0548 10/12/23  0523   * 134* 136   K 4.4 4.2 4.5    108 104   CO2 15* 14* 24    88 116*   BUN 78* 67* 51*   CREATININE 2.6* 2.3* 1.8*   CALCIUM 9.4 9.0 8.4*   PROT 7.0 6.8  --    ALBUMIN 3.3* 3.1*  --    BILITOT 0.6 0.4  --    ALKPHOS 104 102  --    AST 11 13  --    ALT 7* 8*  --    ANIONGAP 13 12 8     , CBC   Recent Labs  "  Lab 10/10/23  1714 10/11/23  0548 10/12/23  0523   WBC 4.85 4.79 4.38   HGB 11.0* 12.1 10.0*   HCT 34.4* 38.4 31.5*    245 SEE COMMENT     , INR   Recent Labs   Lab 10/10/23  1714   INR 1.0     , Lipid Panel No results for input(s): "CHOL", "HDL", "LDLCALC", "TRIG", "CHOLHDL" in the last 48 hours., Troponin   Recent Labs   Lab 10/10/23  1714   TROPONINI 0.011     , and All pertinent lab results from the last 24 hours have been reviewed.    Significant Imaging: Echocardiogram: Transthoracic echo (TTE) complete (Cupid Only):   Results for orders placed or performed during the hospital encounter of 10/10/23   Echo   Result Value Ref Range    LVOT stroke volume 68.50 cm3    LVIDd 4.19 3.5 - 6.0 cm    LV Systolic Volume 35.66 mL    LVIDs 3.02 2.1 - 4.0 cm    LV Diastolic Volume 78.00 mL    IVS 1.06 0.6 - 1.1 cm    LVOT diameter 2.01 cm    LVOT area 3.2 cm2    FS 28 28 - 44 %    Left Ventricle Relative Wall Thickness 0.53 cm    Posterior Wall 1.12 (A) 0.6 - 1.1 cm    LV mass 154.45 g    MV Peak E Chris 0.61 m/s    TDI LATERAL 0.09 m/s    TDI SEPTAL 0.03 m/s    E/E' ratio 10.17 m/s    MV Peak A Chris 0.73 m/s    TR Max Chris 1.89 m/s    E/A ratio 0.84     E wave deceleration time 184.18 msec    LV SEPTAL E/E' RATIO 20.33 m/s    LV LATERAL E/E' RATIO 6.78 m/s    LVOT peak chris 1.17 m/s    Left Ventricular Outflow Tract Mean Velocity 0.75 cm/s    Left Ventricular Outflow Tract Mean Gradient 2.65 mmHg    LA size 2.82 cm    Left Atrium Minor Axis 4.83 cm    Left Atrium Major Axis 4.19 cm    RVDD 3.34 cm    RV S' 16.59 cm/s    TAPSE 1.90 cm    RA Major Axis 4.09 cm    AV regurgitation pressure 1/2 time 876.781510766981725 ms    AR Max Chris 3.98 m/s    AV mean gradient 4 mmHg    AV peak gradient 6 mmHg    Ao peak chris 1.26 m/s    Ao VTI 22.10 cm    LVOT peak VTI 21.60 cm    AV valve area 3.10 cm²    AV Velocity Ratio 0.93     AV index (prosthetic) 0.98     EVARISTO by Velocity Ratio 2.94 cm²    MV mean gradient 1 mmHg    MV peak " gradient 2 mmHg    MV stenosis pressure 1/2 time 53.41 ms    MV valve area p 1/2 method 4.12 cm2    MV valve area by continuity eq 4.31 cm2    MV VTI 15.9 cm    Triscuspid Valve Regurgitation Peak Gradient 14 mmHg    PV PEAK VELOCITY 0.67 m/s    PV peak gradient 2 mmHg    Sinus 3.77 cm    STJ 3.28 cm    Ascending aorta 4.64 cm    IVC diameter 1.82 cm    Mean e' 0.06 m/s    LA volume 26.89 cm3    LA WIDTH 2.5 cm    RA Width 3.5 cm    TV resting pulmonary artery pressure 17 mmHg    RV TB RVSP 5 mmHg    Est. RA pres 3 mmHg    Narrative      Left Ventricle: Normal wall motion. There is normal systolic function   with a visually estimated ejection fraction of 65 - 70%. Grade I diastolic   dysfunction.    Left Atrium: Left atrium is mildly dilated.    Right Ventricle: Normal right ventricular cavity size. Systolic   function is normal.    Aortic Valve: There is mild aortic regurgitation.    Aorta: Ascending aorta is moderately dilated measuring 4.64 cm.    Pulmonary Artery: The estimated pulmonary artery systolic pressure is   17 mmHg.    IVC/SVC: Normal venous pressure at 3 mmHg.       Assessment and Plan:     Brief HPI:     * Acute renal failure superimposed on stage 3 chronic kidney disease        PVC (premature ventricular contraction)  Frequent PVCs in a patient with multiple risk factors for coronary artery disease and recent syncope.  Differential diagnosis of syncope is multifactorial including dehydration.  But because of frequent PVCs, ventricular arrhythmia can not be ruled out.  Check a stress test to rule out large areas of ischemia.  Keep potassium around 4 and magnesium around 2.  Event monitor as an outpatient.  Continue tele monitoring as an inpatient    10/12: stress test today    History of CVA (cerebrovascular accident)        Syncope            VTE Risk Mitigation (From admission, onward)         Ordered     heparin (porcine) injection 5,000 Units  Every 8 hours         10/11/23 1010     IP VTE  HIGH RISK PATIENT  Once         10/10/23 2012     Place sequential compression device  Until discontinued         10/10/23 2012     Place CALE hose  Until discontinued         10/10/23 2012                Kelsey Kebede MD  Cardiology  [facility]

## 2023-10-12 NOTE — CONSULTS
[facility] Surg  Cardiology  Consult Note    Patient Name: Traci Blanca  MRN: [id number]  Admission Date: 10/10/2023  Hospital Length of Stay: 0 days  Code Status: Full Code   Attending Provider: Jane Leyva, Neil   Consulting Provider: Kelsey Kebede MD  Primary Care Physician: Tsering, Primary Doctor  Principal Problem:Acute renal failure superimposed on stage 3 chronic kidney disease    Patient information was obtained from patient and ER records.     Inpatient consult to Cardiology  Consult performed by: Kelsey Kebede MD  Consult ordered by: Jane Leyva MD        Subjective:     Chief Complaint:  syncope     HPI:     Patient is a pleasant 72-year-old lady.  Admitted for dizziness.  As well as syncopal episodes.  Cardiology has been consulted because of frequent bigeminy and trigeminy noted on tele monitoring.  Patient denies any chest pains at rest on exertion, orthopnea, PND             Dizziness       Pt presents to the ED via NO EMS  PT has complaints of intermittent dizziness, fecal / urinary incontinence, non healing wound to buttocks and diarrhea  Per EMS the patient was hypotensive upon arrival in 70/44  EMS administered 1 liter of LR pts /70 at triage  Pt has left sided paralysis (pt leaves at kesha alone )  Pt Alert at traige         HPI: Traci Blanca 72 y.o. female with history of CVA with residual left sided deficits, CKD3, history of PE no longer on anticoagulation presents to the hospital with a chief complaint of syncope.  She reports multiple syncopal episodes over the last week which she attributes to dehydration.  She reports she is been unable to eat for the last week as she was unable to care for her activities of daily since a fall and resultant right shoulder pain 2 weeks ago.  She reports she was examined at a previous hospital.  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  She denies fever chest pain shortness of breath nausea vomiting  abdominal leg swelling melena hematuria hematemesis dizziness.  She reports she was unable to the tolerate Eliquis for previously diagnosed PE due to bleeding complications.     In the ED, chest x-ray with prominence of the aortic arch correlation creatinine 2.6 lactic acid negative troponin negative BNP negative.         No past medical history on file.    No past surgical history on file.    Review of patient's allergies indicates:  No Known Allergies    No current facility-administered medications on file prior to encounter.     Current Outpatient Medications on File Prior to Encounter   Medication Sig    cyanocobalamin (VITAMIN B-12) 100 MCG tablet Take 100 mcg by mouth once daily.    ergocalciferol (ERGOCALCIFEROL) 50,000 unit Cap Take 1 capsule by mouth Daily.    telmisartan (MICARDIS) 80 MG Tab Take 80 mg by mouth Daily.     Family History    None       Tobacco Use    Smoking status: Not on file    Smokeless tobacco: Not on file   Substance and Sexual Activity    Alcohol use: Not on file    Drug use: Not on file    Sexual activity: Not on file     Review of Systems   Constitutional: Positive for malaise/fatigue.   HENT: Negative.     Eyes: Negative.    Cardiovascular:  Positive for near-syncope and syncope.   Respiratory: Negative.     Endocrine: Negative.    Hematologic/Lymphatic: Negative.    Skin: Negative.    Musculoskeletal: Negative.    Gastrointestinal: Negative.    Genitourinary: Negative.    Psychiatric/Behavioral: Negative.     Allergic/Immunologic: Negative.      Objective:     Vital Signs (Most Recent):  Temp: 97.6 °F (36.4 °C) (10/11/23 1937)  Pulse: 89 (10/11/23 1937)  Resp: 18 (10/11/23 1937)  BP: 120/66 (10/11/23 1937)  SpO2: 98 % (10/11/23 1937) Vital Signs (24h Range):  Temp:  [97.6 °F (36.4 °C)-98.6 °F (37 °C)] 97.6 °F (36.4 °C)  Pulse:  [42-94] 89  Resp:  [14-24] 18  SpO2:  [96 %-99 %] 98 %  BP: ()/(40-71) 120/66     Weight: 72.7 kg (160 lb 5.1 oz)  Body mass index is 23  "kg/m².    SpO2: 98 %         Intake/Output Summary (Last 24 hours) at 10/11/2023 2235  Last data filed at 10/11/2023 1740  Gross per 24 hour   Intake 1425 ml   Output --   Net 1425 ml       Lines/Drains/Airways       Peripheral Intravenous Line  Duration                  Peripheral IV - Single Lumen 10/11/23 0941 22 G Posterior;Right Hand <1 day                     Physical Exam  Constitutional:       Appearance: Normal appearance. She is well-developed.   HENT:      Head: Normocephalic.   Eyes:      Pupils: Pupils are equal, round, and reactive to light.   Cardiovascular:      Rate and Rhythm: Normal rate and regular rhythm. Extrasystoles are present.  Pulmonary:      Effort: Pulmonary effort is normal.      Breath sounds: Normal breath sounds.   Abdominal:      General: Bowel sounds are normal.      Palpations: Abdomen is soft.      Tenderness: There is no abdominal tenderness.   Musculoskeletal:         General: Normal range of motion.      Cervical back: Normal range of motion and neck supple.   Skin:     General: Skin is warm.   Neurological:      Mental Status: She is alert and oriented to person, place, and time.          Significant Labs: BMP:   Recent Labs   Lab 10/10/23  1714 10/11/23  0548    88   * 134*   K 4.4 4.2    108   CO2 15* 14*   BUN 78* 67*   CREATININE 2.6* 2.3*   CALCIUM 9.4 9.0   MG 1.8  --    , CMP   Recent Labs   Lab 10/10/23  1714 10/11/23  0548   * 134*   K 4.4 4.2    108   CO2 15* 14*    88   BUN 78* 67*   CREATININE 2.6* 2.3*   CALCIUM 9.4 9.0   PROT 7.0 6.8   ALBUMIN 3.3* 3.1*   BILITOT 0.6 0.4   ALKPHOS 104 102   AST 11 13   ALT 7* 8*   ANIONGAP 13 12   , CBC   Recent Labs   Lab 10/10/23  1714 10/11/23  0548   WBC 4.85 4.79   HGB 11.0* 12.1   HCT 34.4* 38.4    245   , INR   Recent Labs   Lab 10/10/23  1714   INR 1.0   , Lipid Panel No results for input(s): "CHOL", "HDL", "LDLCALC", "TRIG", "CHOLHDL" in the last 48 hours., Troponin   Recent " Labs   Lab 10/10/23  1714   TROPONINI 0.011   , and All pertinent lab results from the last 24 hours have been reviewed.    Significant Imaging: Echocardiogram: Transthoracic echo (TTE) complete (Cupid Only):   Results for orders placed or performed during the hospital encounter of 10/10/23   Echo   Result Value Ref Range    LVOT stroke volume 68.50 cm3    LVIDd 4.19 3.5 - 6.0 cm    LV Systolic Volume 35.66 mL    LVIDs 3.02 2.1 - 4.0 cm    LV Diastolic Volume 78.00 mL    IVS 1.06 0.6 - 1.1 cm    LVOT diameter 2.01 cm    LVOT area 3.2 cm2    FS 28 28 - 44 %    Left Ventricle Relative Wall Thickness 0.53 cm    Posterior Wall 1.12 (A) 0.6 - 1.1 cm    LV mass 154.45 g    MV Peak E Chris 0.61 m/s    TDI LATERAL 0.09 m/s    TDI SEPTAL 0.03 m/s    E/E' ratio 10.17 m/s    MV Peak A Chris 0.73 m/s    TR Max Chris 1.89 m/s    E/A ratio 0.84     E wave deceleration time 184.18 msec    LV SEPTAL E/E' RATIO 20.33 m/s    LV LATERAL E/E' RATIO 6.78 m/s    LVOT peak chris 1.17 m/s    Left Ventricular Outflow Tract Mean Velocity 0.75 cm/s    Left Ventricular Outflow Tract Mean Gradient 2.65 mmHg    LA size 2.82 cm    Left Atrium Minor Axis 4.83 cm    Left Atrium Major Axis 4.19 cm    RVDD 3.34 cm    RV S' 16.59 cm/s    TAPSE 1.90 cm    RA Major Axis 4.09 cm    AV regurgitation pressure 1/2 time 876.851255764527020 ms    AR Max Chris 3.98 m/s    AV mean gradient 4 mmHg    AV peak gradient 6 mmHg    Ao peak chris 1.26 m/s    Ao VTI 22.10 cm    LVOT peak VTI 21.60 cm    AV valve area 3.10 cm²    AV Velocity Ratio 0.93     AV index (prosthetic) 0.98     EVARISTO by Velocity Ratio 2.94 cm²    MV mean gradient 1 mmHg    MV peak gradient 2 mmHg    MV stenosis pressure 1/2 time 53.41 ms    MV valve area p 1/2 method 4.12 cm2    MV valve area by continuity eq 4.31 cm2    MV VTI 15.9 cm    Triscuspid Valve Regurgitation Peak Gradient 14 mmHg    PV PEAK VELOCITY 0.67 m/s    PV peak gradient 2 mmHg    Sinus 3.77 cm    STJ 3.28 cm    Ascending aorta 4.64 cm     IVC diameter 1.82 cm    Mean e' 0.06 m/s    LA volume 26.89 cm3    LA WIDTH 2.5 cm    RA Width 3.5 cm    TV resting pulmonary artery pressure 17 mmHg    RV TB RVSP 5 mmHg    Est. RA pres 3 mmHg    Narrative      Left Ventricle: Normal wall motion. There is normal systolic function   with a visually estimated ejection fraction of 65 - 70%. Grade I diastolic   dysfunction.    Left Atrium: Left atrium is mildly dilated.    Right Ventricle: Normal right ventricular cavity size. Systolic   function is normal.    Aortic Valve: There is mild aortic regurgitation.    Aorta: Ascending aorta is moderately dilated measuring 4.64 cm.    Pulmonary Artery: The estimated pulmonary artery systolic pressure is   17 mmHg.    IVC/SVC: Normal venous pressure at 3 mmHg.       Assessment and Plan:     * Acute renal failure superimposed on stage 3 chronic kidney disease        PVC (premature ventricular contraction)  Frequent PVCs in a patient with multiple risk factors for coronary artery disease and recent syncope.  Differential diagnosis of syncope is multifactorial including dehydration.  But because of frequent PVCs, ventricular arrhythmia can not be ruled out.  Check a stress test to rule out large areas of ischemia.  Keep potassium around 4 and magnesium around 2.  Event monitor as an outpatient.  Continue tele monitoring as an inpatient    History of CVA (cerebrovascular accident)        Syncope            VTE Risk Mitigation (From admission, onward)         Ordered     heparin (porcine) injection 5,000 Units  Every 8 hours         10/11/23 1010     IP VTE HIGH RISK PATIENT  Once         10/10/23 2012     Place sequential compression device  Until discontinued         10/10/23 2012     Place CALE hose  Until discontinued         10/10/23 2012                Thank you for your consult. I will follow-up with patient. Please contact us if you have any additional questions.    Kelsey Kebede MD  Cardiology   [facility]  Surg

## 2023-10-12 NOTE — PT/OT/SLP EVAL
Physical Therapy Evaluation    Patient Name:  Traci Blanca   MRN:  [id number]    Recommendations:     Discharge Recommendations: nursing facility, skilled   Discharge Equipment Recommendations:  (Ongoing assessment pending pt progress)   Barriers to discharge:  Pt is not functioning at Independent baseline following fall and is at increased risk for falls and readmission if she returns home at present time.    Assessment:     Traci Blanca is a 72 y.o. female admitted with a medical diagnosis of Acute renal failure superimposed on stage 3 chronic kidney disease.  She presents with the following impairments/functional limitations: weakness, impaired functional mobility, decreased safety awareness, impaired coordination, impaired endurance, decreased coordination, pain, impaired balance, decreased upper extremity function, impaired self care skills, decreased lower extremity function, decreased ROM, edema .    Rehab Prognosis: Good; patient would benefit from acute skilled PT services to address these deficits and reach maximum level of function.    Recent Surgery: * No surgery found *      Plan:     During this hospitalization, patient to be seen  (3-5x/wk) to address the identified rehab impairments via therapeutic activities, therapeutic exercises, neuromuscular re-education, wheelchair management/training and progress toward the following goals:    Plan of Care Expires:  10/26/23    Subjective     Chief Complaint: pain, fear of falling  Patient/Family Comments/goals: PLOF, Pt states that she is motivated to return to Independent living  Pain/Comfort:  Pain Rating 1:  (not rated)  Location - Orientation 1:  (R shoulder and knee)  Pain Addressed 1: Reposition, Distraction, Cessation of Activity    Patients cultural, spiritual, Mandaen conflicts given the current situation: no    Living Environment:  Approx 2 weeks ago pt was living alone in a [facility] with ramp.  Since her fall has been relying on help of  neighbors, friends, and hired sitters   Prior to admission, patients level of function was Modified Independent with mobility and ADL's at W/C level .  Equipment used at home: bedside commode, wheelchair.  DME owned (not currently used): none.  Upon discharge, patient will have assistance from ?.    Objective:     Communicated with nsg prior to session.  Patient found  slid down in bed  with bed alarm, PureWick, peripheral IV  upon PT entry to room.    General Precautions: Standard, fall  Orthopedic Precautions:N/A   Braces: N/A  Respiratory Status: Room air    Exams:  Cognitive Exam:  Patient is oriented to Person, Place, Time, and Situation, motivated, but self limiting 2/2 pain and anxiety, able to follow commands  Gross Motor Coordination:  impaired 2/2 pain, weakness, residual deficits from prior CVA  Postural Exam:  Patient presented with the following abnormalities:    -       Rounded shoulders  -       Forward head  L UE contracted  Arthritic R knee  Sensation:    -       Intact  light/touch R LE  -       Impaired  light/touch L LE  Skin Integrity/Edema:      -       Skin integrity: Visible skin intact  -       Edema: R knee Mod/arthritic   RLE ROM: R knee PROM limited approx 45-90*  RLE Strength: Dflx 5/5, knee ext 2+/5  LLE ROM: Pflx contracture, PROM approx -20*  LLE Strength: Hip flex WFL's, knee and ankle 0/5  Increased extensor tone L LE    Functional Mobility:  Bed Mobility:     Rolling Left:  dependence  Rolling Right: dependence  Scooting: dependence  Supine to Sit: dependence  Sit to Supine: dependence  All with max VC/TC for body mechanics and hand placement  All with increased time to perform  Transfers:     Bed <> W/C: total assistance, of 2 persons, and with max VC/TC for hand placement and forward weigh shift over GARFIELD  with  drop arm W/C using  Scoot Pivot technique with increased time to perform  Mobility: Pt propelled W/C with R UE and R LE with L LE crossed over R LE approx 50' on level  tile with Min<>Max A for steering and maneuvering and VC/TC for same  Balance: Fair sitting, leaning backward      AM-PAC 6 CLICK MOBILITY  Total Score:9       Treatment & Education:  Bed mobility training as above.  Pt sat at EOB with Max<>CGA with max VC/TC for forward weight shift over GARFIELD.  Transfer training as above.  Pt required SBA for R W/C brake management and Total A for L W/C brake, arm rest management and alignment for transfers.  Educated pt on rolling B and Modified bridging for pressure relief. Educated pt on PT POC, importance of OOB to chair, bed in chair position, and participation with PT/OT for best recovery of functional independence.  Dependently repositioned pt to HOB with Pressure relief boots donned.     Patient left  as above  with all lines intact, call button in reach, bed alarm on, and nsg notified.    GOALS:   Multidisciplinary Problems       Physical Therapy Goals          Problem: Physical Therapy    Goal Priority Disciplines Outcome Goal Variances Interventions   Physical Therapy Goal     PT, PT/OT Ongoing, Progressing     Description: Goals to be met by: 10/26/23     Patient will increase functional independence with mobility by performin. Supine to sit with Modified [place]  2. Rolling to Left and Right with Modified [place].  3. Bed to chair transfer with Modified [place]   4. Wheelchair propulsion x50-100 feet with Modified [place]   5. Sitting at edge of bed x15 minutes with Modified [place]  6. Lower extremity exercise program x10 reps per handout, with independence                         History:     No past medical history on file.    No past surgical history on file.    Time Tracking:     PT Received On: 10/12/23  PT Start Time: 1349     PT Stop Time: 1443  PT Total Time (min): 54 min     Billable Minutes: Evaluation 15, Therapeutic Activity 15, and Train/Wheelchair Management 15 Co-evaluation with OT      10/12/2023

## 2023-10-12 NOTE — ASSESSMENT & PLAN NOTE
Patient has bigeminy and trigeminy,consulted cardiology,will have NST today,will keep electrolyte WNL.  Replaced magnesium.

## 2023-10-12 NOTE — PLAN OF CARE
Problem: Occupational Therapy  Goal: Occupational Therapy Goal  Description: Goals to be met by: 10/26/23     Patient will increase functional independence with ADLs by performing:    Feeding with Modified Clifford and Set-up Assistance.  UE Dressing with Contact Guard Assistance.  LE Dressing with Contact Guard Assistance.  Grooming while seated at sink with Modified Clifford and Supervision.  Toileting from bedside commode with Set-up Assistance and Supervision for hygiene and clothing management.   Sitting at edge of bed x30 minutes with Modified Clifford.  Rolling to Bilateral with Modified Clifford and Supervision.   Supine to sit with Modified Clifford, Set-up Assistance, and use of bedrail as needed.  Squat pivot transfers with Stand-by Assistance.  Toilet transfer to bedside commode with Stand-by Assistance.  Upper extremity exercise program x15 reps per handout, with independence.    Outcome: Ongoing, Progressing   The patient will benefit from SNF.

## 2023-10-12 NOTE — SUBJECTIVE & OBJECTIVE
No past medical history on file.    No past surgical history on file.    Review of patient's allergies indicates:  No Known Allergies    No current facility-administered medications on file prior to encounter.     Current Outpatient Medications on File Prior to Encounter   Medication Sig    cyanocobalamin (VITAMIN B-12) 100 MCG tablet Take 100 mcg by mouth once daily.    ergocalciferol (ERGOCALCIFEROL) 50,000 unit Cap Take 1 capsule by mouth Daily.    telmisartan (MICARDIS) 80 MG Tab Take 80 mg by mouth Daily.     Family History    None       Tobacco Use    Smoking status: Not on file    Smokeless tobacco: Not on file   Substance and Sexual Activity    Alcohol use: Not on file    Drug use: Not on file    Sexual activity: Not on file     Review of Systems   Constitutional: Positive for malaise/fatigue.   HENT: Negative.     Eyes: Negative.    Cardiovascular:  Positive for near-syncope and syncope.   Respiratory: Negative.     Endocrine: Negative.    Hematologic/Lymphatic: Negative.    Skin: Negative.    Musculoskeletal: Negative.    Gastrointestinal: Negative.    Genitourinary: Negative.    Psychiatric/Behavioral: Negative.     Allergic/Immunologic: Negative.      Objective:     Vital Signs (Most Recent):  Temp: 97.6 °F (36.4 °C) (10/11/23 1937)  Pulse: 89 (10/11/23 1937)  Resp: 18 (10/11/23 1937)  BP: 120/66 (10/11/23 1937)  SpO2: 98 % (10/11/23 1937) Vital Signs (24h Range):  Temp:  [97.6 °F (36.4 °C)-98.6 °F (37 °C)] 97.6 °F (36.4 °C)  Pulse:  [42-94] 89  Resp:  [14-24] 18  SpO2:  [96 %-99 %] 98 %  BP: ()/(40-71) 120/66     Weight: 72.7 kg (160 lb 5.1 oz)  Body mass index is 23 kg/m².    SpO2: 98 %         Intake/Output Summary (Last 24 hours) at 10/11/2023 2235  Last data filed at 10/11/2023 1740  Gross per 24 hour   Intake 1425 ml   Output --   Net 1425 ml       Lines/Drains/Airways       Peripheral Intravenous Line  Duration                  Peripheral IV - Single Lumen 10/11/23 0941 22 G Posterior;Right  "Hand <1 day                     Physical Exam  Constitutional:       Appearance: Normal appearance. She is well-developed.   HENT:      Head: Normocephalic.   Eyes:      Pupils: Pupils are equal, round, and reactive to light.   Cardiovascular:      Rate and Rhythm: Normal rate and regular rhythm. Extrasystoles are present.  Pulmonary:      Effort: Pulmonary effort is normal.      Breath sounds: Normal breath sounds.   Abdominal:      General: Bowel sounds are normal.      Palpations: Abdomen is soft.      Tenderness: There is no abdominal tenderness.   Musculoskeletal:         General: Normal range of motion.      Cervical back: Normal range of motion and neck supple.   Skin:     General: Skin is warm.   Neurological:      Mental Status: She is alert and oriented to person, place, and time.          Significant Labs: BMP:   Recent Labs   Lab 10/10/23  1714 10/11/23  0548    88   * 134*   K 4.4 4.2    108   CO2 15* 14*   BUN 78* 67*   CREATININE 2.6* 2.3*   CALCIUM 9.4 9.0   MG 1.8  --    , CMP   Recent Labs   Lab 10/10/23  1714 10/11/23  0548   * 134*   K 4.4 4.2    108   CO2 15* 14*    88   BUN 78* 67*   CREATININE 2.6* 2.3*   CALCIUM 9.4 9.0   PROT 7.0 6.8   ALBUMIN 3.3* 3.1*   BILITOT 0.6 0.4   ALKPHOS 104 102   AST 11 13   ALT 7* 8*   ANIONGAP 13 12   , CBC   Recent Labs   Lab 10/10/23  1714 10/11/23  0548   WBC 4.85 4.79   HGB 11.0* 12.1   HCT 34.4* 38.4    245   , INR   Recent Labs   Lab 10/10/23  1714   INR 1.0   , Lipid Panel No results for input(s): "CHOL", "HDL", "LDLCALC", "TRIG", "CHOLHDL" in the last 48 hours., Troponin   Recent Labs   Lab 10/10/23  1714   TROPONINI 0.011   , and All pertinent lab results from the last 24 hours have been reviewed.    Significant Imaging: Echocardiogram: Transthoracic echo (TTE) complete (Cupid Only):   Results for orders placed or performed during the hospital encounter of 10/10/23   Echo   Result Value Ref Range    LVOT " stroke volume 68.50 cm3    LVIDd 4.19 3.5 - 6.0 cm    LV Systolic Volume 35.66 mL    LVIDs 3.02 2.1 - 4.0 cm    LV Diastolic Volume 78.00 mL    IVS 1.06 0.6 - 1.1 cm    LVOT diameter 2.01 cm    LVOT area 3.2 cm2    FS 28 28 - 44 %    Left Ventricle Relative Wall Thickness 0.53 cm    Posterior Wall 1.12 (A) 0.6 - 1.1 cm    LV mass 154.45 g    MV Peak E Chris 0.61 m/s    TDI LATERAL 0.09 m/s    TDI SEPTAL 0.03 m/s    E/E' ratio 10.17 m/s    MV Peak A Chris 0.73 m/s    TR Max Chris 1.89 m/s    E/A ratio 0.84     E wave deceleration time 184.18 msec    LV SEPTAL E/E' RATIO 20.33 m/s    LV LATERAL E/E' RATIO 6.78 m/s    LVOT peak chris 1.17 m/s    Left Ventricular Outflow Tract Mean Velocity 0.75 cm/s    Left Ventricular Outflow Tract Mean Gradient 2.65 mmHg    LA size 2.82 cm    Left Atrium Minor Axis 4.83 cm    Left Atrium Major Axis 4.19 cm    RVDD 3.34 cm    RV S' 16.59 cm/s    TAPSE 1.90 cm    RA Major Axis 4.09 cm    AV regurgitation pressure 1/2 time 876.293057287732883 ms    AR Max Chris 3.98 m/s    AV mean gradient 4 mmHg    AV peak gradient 6 mmHg    Ao peak chris 1.26 m/s    Ao VTI 22.10 cm    LVOT peak VTI 21.60 cm    AV valve area 3.10 cm²    AV Velocity Ratio 0.93     AV index (prosthetic) 0.98     EVARISTO by Velocity Ratio 2.94 cm²    MV mean gradient 1 mmHg    MV peak gradient 2 mmHg    MV stenosis pressure 1/2 time 53.41 ms    MV valve area p 1/2 method 4.12 cm2    MV valve area by continuity eq 4.31 cm2    MV VTI 15.9 cm    Triscuspid Valve Regurgitation Peak Gradient 14 mmHg    PV PEAK VELOCITY 0.67 m/s    PV peak gradient 2 mmHg    Sinus 3.77 cm    STJ 3.28 cm    Ascending aorta 4.64 cm    IVC diameter 1.82 cm    Mean e' 0.06 m/s    LA volume 26.89 cm3    LA WIDTH 2.5 cm    RA Width 3.5 cm    TV resting pulmonary artery pressure 17 mmHg    RV TB RVSP 5 mmHg    Est. RA pres 3 mmHg    Narrative      Left Ventricle: Normal wall motion. There is normal systolic function   with a visually estimated ejection fraction of 65  - 70%. Grade I diastolic   dysfunction.    Left Atrium: Left atrium is mildly dilated.    Right Ventricle: Normal right ventricular cavity size. Systolic   function is normal.    Aortic Valve: There is mild aortic regurgitation.    Aorta: Ascending aorta is moderately dilated measuring 4.64 cm.    Pulmonary Artery: The estimated pulmonary artery systolic pressure is   17 mmHg.    IVC/SVC: Normal venous pressure at 3 mmHg.

## 2023-10-12 NOTE — PT/OT/SLP PROGRESS
Physical Therapy      Patient Name:  Traci Blanca   MRN:  [id number]    Patient not seen today secondary to  (Being transported to [facility]). Will follow-up .

## 2023-10-12 NOTE — NURSING
[facility] [place]  Nurses Note -- 4 Eyes      10/12/2023       Skin assessed on: Q Shift      [] No Pressure Injuries Present    []Prevention Measures Documented    [x] Yes LDA  for Pressure Injury Previously documented     [] Yes New Pressure Injury Discovered   [] LDA for New Pressure Injury Added      Attending RN:  Milly Amador RN     Second RN:  Francesca Romeo RN

## 2023-10-12 NOTE — PT/OT/SLP EVAL
"Speech Language Pathology Evaluation  Bedside Swallow    Patient Name:  Traci Blanca   MRN:  [id number]  Admitting Diagnosis: Acute renal failure superimposed on stage 3 chronic kidney disease    Recommendations:                 General Recommendations:   ST f/u for diet tolerance   Diet recommendations:  Regular Diet - IDDSI Level 7, Thin liquids - IDDSI Level 0   Aspiration Precautions: Meds whole 1 at a time and Standard aspiration precautions   General Precautions: Standard,    Communication strategies:  none    Assessment:     Traci Blanca is a 72 y.o. female with a dx of Acute renal failure superimposed on stage 3 chronic kidney disease, who presents with functional swallowing abilities, and is safe to advance to a regular diet with thin liquids. Meds whole. ST to f/u to ensure diet tolerance.     History:     No past medical history on file.    No past surgical history on file.    Social History: Patient lives alone and was indep, however, decline in functional abilities since recent shoulder injury.    Prior Intubation HX:  None    Modified Barium Swallow: None per EMR    Chest X-Rays:   10/10/23    Impression:     This report was flagged in Epic as abnormal.     1. Interstitial findings may reflect underlying COPD/emphysema.  No large focal consolidation.  2. There is prominence of the aortic arch, correlation with previous exams advised, PA and lateral could be performed for further evaluation as warranted.    Prior diet: Unrestricted, if able to sit upright in chair for optimal positioning. Hx of dysphagia following Cva ~20 years prior.    Subjective     Pt was alert and awake upon ST's entrance. Pt greeted ST and was agreeable to b/s eval. Pt was able to verbally recall extensive medical hx since CVA, as well as functional strategies she implements 2/2 L hemiparesis. Pt stating functional swallowing, if she is able to get in an optimal position, preferably in w/c for meals.      Patient goals: "To " "get my strengthen back"     Pain/Comfort:  Pain Rating 1: 0/10    Respiratory Status: Room air    Objective:     Oral Musculature Evaluation  Oral Musculature: WFL  Dentition: present and adequate  Secretion Management: adequate  Mucosal Quality: good  Oral Labial Strength and Mobility: WFL  Lingual Strength and Mobility: WFL  Volitional Cough: Weak  Volitional Swallow: Delayed  Voice Prior to PO Intake: Clear and audible    Bedside Swallow Eval:   Consistencies Assessed:  Thin liquids -x4 cup-edge sips of water  Puree -x5 tsp bites of pudding  Solids -x1 bite sized piece of carol cracker      Oral Phase:   WFL    Pharyngeal Phase:   no overt clinical signs/symptoms of aspiration  no overt clinical signs/symptoms of pharyngeal dysphagia    Compensatory Strategies  None    Treatment: It should be noted that silent aspiration cannot be r/o via b/s assessment. ST discussed recs with the pt including regular diet with thin liquids. Pt v/u and was agreeable to diet.       Goals:   Multidisciplinary Problems       SLP Goals          Problem: SLP    Goal Priority Disciplines Outcome   SLP Goal    Low SLP Ongoing, Progressing   Description: Goals:  1. Pt will tolerate a regular diet with thin liquids w/o any overt s/s of aspiration across x2 consecutive sessions (1 of 2 met 10/12).                          Plan:     Patient to be seen:  2 x/week   Plan of Care expires:     Plan of Care reviewed with:  patient   SLP Follow-Up:  Yes       Discharge recommendations:      Barriers to Discharge:  None    Time Tracking:     SLP Treatment Date:   10/12/23  Speech Start Time:  1130  Speech Stop Time:  1154     Speech Total Time (min):  24 min    Billable Minutes: Treatment Swallowing Dysfunction 10 and Eval Swallow and Oral Function 14    10/12/2023         "

## 2023-10-12 NOTE — PLAN OF CARE
Problem: Physical Therapy  Goal: Physical Therapy Goal  Description: Goals to be met by: 10/26/23     Patient will increase functional independence with mobility by performin. Supine to sit with Modified [place]  2. Rolling to Left and Right with Modified [place].  3. Bed to chair transfer with Modified [place]   4. Wheelchair propulsion x50-100 feet with Modified [place]   5. Sitting at edge of bed x15 minutes with Modified [place]  6. Lower extremity exercise program x10 reps per handout, with independence    Outcome: Ongoing, Progressing   Initial PT evaluation performed today.  Pt could benefit from skilled PT services 3-5x/wk in order to maximize function prior to D/C.  SNF recommended as pt is not functioning at Independent W/C level for mobility or ADL's.

## 2023-10-12 NOTE — PROGRESS NOTES
[facility]  Progress Note    Patient Name: Traci Blanca  MRN: [id number]  Patient Class: OP- Observation   Admission Date: 10/10/2023  Length of Stay: 0 days  Attending Physician: Jane Leyva, *  Primary Care Provider: [place], Primary Doctor        Subjective:     Principal Problem:Acute renal failure superimposed on stage 3 chronic kidney disease        HPI:  Traci Blanca 72 y.o. female with history of CVA with residual left sided deficits, CKD3, history of PE no longer on anticoagulation presents to the hospital with a chief complaint of syncope.  She reports multiple syncopal episodes over the last week which she attributes to dehydration.  She reports she is been unable to eat for the last week as she was unable to care for her activities of daily since a fall and resultant right shoulder pain 2 weeks ago.  She reports she was examined at a previous hospital.  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  She denies fever chest pain shortness of breath nausea vomiting abdominal leg swelling melena hematuria hematemesis dizziness.  She reports she was unable to the tolerate Eliquis for previously diagnosed PE due to bleeding complications.    In the ED, chest x-ray with prominence of the aortic arch correlation creatinine 2.6 lactic acid negative troponin negative BNP negative.      Overview/Hospital Course:  Traci Blanca 72 y.o. female with history of CVA with residual left sided deficits, CKD3, history of PE no longer on anticoagulation presents to the hospital with a chief complaint of syncope.  She reports multiple syncopal episodes over the last week which she attributes to dehydration.   Patient has ARF on CKD and is on IVF,changed  to bicarb for acidosis,check bladder scan  Started on Rocephin for UTI.  No fracture on imaging,  Consult wound care for sacral wounds,  PT,OT.  Patient has bigeminy and trigeminy,consulted cardiology,will have NST  today,will keep electrolyte WNL.  Replaced magnesium.      No past medical history on file.    No past surgical history on file.    Review of patient's allergies indicates:  No Known Allergies    No current facility-administered medications on file prior to encounter.     Current Outpatient Medications on File Prior to Encounter   Medication Sig    cyanocobalamin (VITAMIN B-12) 100 MCG tablet Take 100 mcg by mouth once daily.    ergocalciferol (ERGOCALCIFEROL) 50,000 unit Cap Take 1 capsule by mouth Daily.    telmisartan (MICARDIS) 80 MG Tab Take 80 mg by mouth Daily.     Family History    None       Tobacco Use    Smoking status: Not on file    Smokeless tobacco: Not on file   Substance and Sexual Activity    Alcohol use: Not on file    Drug use: Not on file    Sexual activity: Not on file     Review of Systems   Constitutional:  Negative for chills and fever.   HENT:  Negative for nosebleeds and tinnitus.    Eyes:  Negative for photophobia and visual disturbance.   Respiratory:  Negative for shortness of breath and wheezing.    Cardiovascular:  Negative for chest pain, palpitations and leg swelling.   Gastrointestinal:  Negative for abdominal distention, nausea and vomiting.   Genitourinary:  Negative for dysuria, flank pain and hematuria.   Musculoskeletal:  Positive for arthralgias. Negative for gait problem and joint swelling.   Skin:  Negative for rash and wound.   Neurological:  Negative for seizures and syncope.     Objective:     Vital Signs (Most Recent):  Temp: 97.9 °F (36.6 °C) (10/12/23 0737)  Pulse: 70 (10/12/23 0737)  Resp: 20 (10/12/23 0737)  BP: (!) 107/56 (10/12/23 0737)  SpO2: 95 % (10/12/23 0737) Vital Signs (24h Range):  Temp:  [97.4 °F (36.3 °C)-98.6 °F (37 °C)] 97.9 °F (36.6 °C)  Pulse:  [42-89] 70  Resp:  [18-20] 20  SpO2:  [92 %-98 %] 95 %  BP: (107-126)/(56-66) 107/56     Weight: 72.7 kg (160 lb 5.1 oz)  Body mass index is 23 kg/m².     Physical Exam  Vitals and nursing note  reviewed.   Constitutional:       General: She is not in acute distress.     Appearance: She is well-developed. She is not diaphoretic.   HENT:      Head: Normocephalic and atraumatic.      Right Ear: External ear normal.      Left Ear: External ear normal.   Eyes:      General:         Right eye: No discharge.         Left eye: No discharge.      Conjunctiva/sclera: Conjunctivae normal.   Neck:      Thyroid: No thyromegaly.   Cardiovascular:      Rate and Rhythm: Normal rate and regular rhythm.      Heart sounds: No murmur heard.  Pulmonary:      Effort: Pulmonary effort is normal. No respiratory distress.      Breath sounds: Normal breath sounds.   Abdominal:      General: Bowel sounds are normal. There is no distension.      Palpations: Abdomen is soft. There is no mass.      Tenderness: There is no abdominal tenderness.   Musculoskeletal:         General: No deformity.      Cervical back: Normal range of motion and neck supple.      Right lower leg: No edema.      Left lower leg: No edema.   Skin:     General: Skin is warm and dry.   Neurological:      Mental Status: She is alert and oriented to person, place, and time.      Sensory: No sensory deficit.      Comments: Right sided upper and lower paralysis. Baseline per patient since previous CVA   Psychiatric:         Mood and Affect: Mood normal.         Behavior: Behavior normal.                Significant Labs: CBC:   Recent Labs   Lab 10/10/23  1714 10/11/23  0548 10/12/23  0523   WBC 4.85 4.79 4.38   HGB 11.0* 12.1 10.0*   HCT 34.4* 38.4 31.5*    245 SEE COMMENT       CMP:   Recent Labs   Lab 10/10/23  1714 10/11/23  0548 10/12/23  0523   * 134* 136   K 4.4 4.2 4.5    108 104   CO2 15* 14* 24    88 116*   BUN 78* 67* 51*   CREATININE 2.6* 2.3* 1.8*   CALCIUM 9.4 9.0 8.4*   PROT 7.0 6.8  --    ALBUMIN 3.3* 3.1*  --    BILITOT 0.6 0.4  --    ALKPHOS 104 102  --    AST 11 13  --    ALT 7* 8*  --    ANIONGAP 13 12 8       Cardiac  Markers:   Recent Labs   Lab 10/10/23  1714   BNP 86       Coagulation:   Recent Labs   Lab 10/10/23  1714   INR 1.0       Lactic Acid:   Recent Labs   Lab 10/10/23  1714   LACTATE 2.2       Troponin:   Recent Labs   Lab 10/10/23  1714   TROPONINI 0.011       Urine Studies:   Recent Labs   Lab 10/10/23  1903   COLORU Yellow   APPEARANCEUA Hazy*   PHUR 5.0   SPECGRAV 1.010   PROTEINUA Trace*   GLUCUA Negative   KETONESU Negative   BILIRUBINUA Negative   OCCULTUA Trace*   NITRITE Negative   UROBILINOGEN Negative   LEUKOCYTESUR 3+*   RBCUA 2   WBCUA >100*   BACTERIA Moderate*         Significant Imaging:   Imaging Results               CT Chest Abdomen Pelvis Without Contrast (XPD) (Final result)  Result time 10/10/23 22:45:58      Final result by Vangie Heard MD (10/10/23 22:45:58)                   Impression:      Aneurysmal dilatation of the ascending thoracic aorta and common iliac arteries.  Moderate atherosclerotic changes.  If concern for dissection, consider additional imaging.    3 mm left apical pulmonary nodule.  For a solid nodule <6 mm, Fleischner Society 2017 guidelines recommend no routine follow up for a low risk patient, or follow-up with non-contrast chest CT at 12 months in a high risk patient.    Indeterminate low-attenuation left adrenal lesion.    Colonic diverticulosis.    Air-fluid level in the urinary bladder.  Differential considerations may include attempted instrumentation with a Fitzgerald catheter, fistulous formation, or gas-forming organism.    This report was flagged in Epic as abnormal.      Electronically signed by: Vangie Heard  Date:    10/10/2023  Time:    22:45               Narrative:    EXAMINATION:  CT CHEST ABDOMEN PELVIS WITHOUT    CLINICAL HISTORY:  Evaluation of syncope.  Hypotension onset 2 weeks ago.    TECHNIQUE:  1.25 mm unenhanced axial images from the lung apices through the greater trochanters were performed.  Coronal and sagittal reformatted images were  provided.  No IV contrast was given.    COMPARISON:  None.    FINDINGS:  In the chest, there is aneurysmal dilatation of the ascending thoracic aorta measuring up to 4.9 cm (series 2 axial image 69).  The thoracic aorta is tortuous.  There is moderate atherosclerotic changes.  A 3 mm pulmonary nodule is seen at the left lung apex (series 4 axial image 98).    Within the limits of a noncontrast examination, in the abdomen, there is no aneurysmal dilatation of the tortuous abdominal aorta.  There are moderate atherosclerotic changes.  The liver, spleen, pancreas, kidneys, and right adrenal gland are unremarkable.  The gallbladder is surgically absent.  There is a low-attenuation lobular left adrenal lesion measuring 2.0 x 1.8 cm (series 2 axial image 118). There is no gross abdominal adenopathy or ascites.  Moderate atherosclerotic disease is present.    In the pelvis, the right common iliac artery measures up to 2 cm (series 2 axial image 175).  The left common iliac artery measures up to 1.7 cm (series 2 axial image 179).  There are no pelvic masses or adenopathy.  There is no free pelvic fluid.  There is colonic diverticulosis.  There is a calcified uterine fibroid.  A small air-fluid level is seen in the urinary bladder.  Marginal and bridging osteophytes are present.                                        X-Ray Chest AP Portable (Final result)  Result time 10/10/23 18:27:04      Final result by Da Martinez MD (10/10/23 18:27:04)                   Impression:      This report was flagged in Epic as abnormal.    1. Interstitial findings may reflect underlying COPD/emphysema.  No large focal consolidation.  2. There is prominence of the aortic arch, correlation with previous exams advised, PA and lateral could be performed for further evaluation as warranted.      Electronically signed by: Da Martinez MD  Date:    10/10/2023  Time:    18:27               Narrative:    EXAMINATION:  XR CHEST AP  PORTABLE    CLINICAL HISTORY:  hypotension;    TECHNIQUE:  Single frontal view of the chest was performed.    COMPARISON:  None    FINDINGS:  The cardiomediastinal silhouette is not enlarged noting prominence of the aorta..  There is no pleural effusion.  The trachea is midline.  The lungs are symmetrically expanded bilaterally with coarse interstitial attenuation.  There are a few scattered calcified granuloma.  No large focal consolidation seen.  There is no pneumothorax.  The osseous structures are remarkable for degenerative change..                                       X-Ray Humerus 2 View Right (Final result)  Result time 10/10/23 18:05:56      Final result by Jade Rodriguez MD (10/10/23 18:05:56)                   Impression:      No acute findings.      Electronically signed by: Jade Rodriguez  Date:    10/10/2023  Time:    18:05               Narrative:    EXAMINATION:  XR HUMERUS 2 VIEW RIGHT    CLINICAL HISTORY:  Pain in arm, unspecified    TECHNIQUE:  Four views    COMPARISON:  None    FINDINGS:  Osteopenia.  No acute fracture or dislocation.  Mild glenohumeral and moderate acromioclavicular joint osteoarthritis.                                          Assessment/Plan:      * Acute renal failure superimposed on stage 3 chronic kidney disease  Presents with Cr of 2.6, baseline of 1.9 to 2.1 suspect related to decreased PO intake as unable to eat due to baseline paralysis and right sided shoulder pain now limit ADLs  Started on IVF  Renal dose medications, avoid nephrotoxic drugs, monitor intake and output  Home telmisartan held        Patient with acute kidney injury/acute renal failure likely due to pre-renal azotemia due to dehydration WALT is currently stable. Baseline creatinine 1.9 to 2.1 - Labs reviewed- Renal function/electrolytes with Estimated Creatinine Clearance: 30.6 mL/min (A) (based on SCr of 1.8 mg/dL (H)). according to latest data. Monitor urine output and serial BMP and  "adjust therapy as needed. Avoid nephrotoxins and renally dose meds for GFR listed above.  Changed  IVF  to bicarb drip for worsening acidosis.  Check bladder  Scan.  Improved.    Hypomagnesemia  Patient has Abnormal Magnesium: hypomagnesemia. Will continue to monitor electrolytes closely. Will replace the affected electrolytes and repeat labs to be done after interventions completed. The patient's magnesium results have been reviewed and are listed below.  Recent Labs   Lab 10/12/23  0523   MG 1.5*        PVC (premature ventricular contraction)    Patient has bigeminy and trigeminy,consulted cardiology,will have NST today,will keep electrolyte WNL.  Replaced magnesium.    Metabolic acidosis  Started on bicarb drip.improved.      Urinary tract infection without hematuria  On rocephin.  Culture grow Ecoli.    History of CVA (cerebrovascular accident)  With residual left sided deficits. As above. PT/OT consulted    Syncope  Reports multiple syncopal episodes over the last week, suspect related dehydration as has been eating less due to inability to care for herself since fall with shoulder pain.   Troponin trend  Telemetry  Echo  Continue IVF given WALT  Will check CTA without contrast given mediastinal widening on CT  Has symmetric radial pulses     ADDENDUM: CT returned with 4.9cm aneurysm of ascending thoracic aorta similar to the 5.1cm seen on previous Echo and CT chest in care everywhere in 2022. Without chest pain, hypertension, and symetric radial pulses aortic dissection is unlikely. Per care everywhere "there is an aneurysm of the ascending aorta with a maximum axial dimension of 5.1 cm."    Shoulder pain  Has shoulder pain since fall over 2 weeks ago. X-ray without fractures  PT/OT consulted      VTE Risk Mitigation (From admission, onward)         Ordered     heparin (porcine) injection 5,000 Units  Every 8 hours         10/11/23 1010     IP VTE HIGH RISK PATIENT  Once         10/10/23 2012     Place " sequential compression device  Until discontinued         10/10/23 2012     Place CALE hose  Until discontinued         10/10/23 2012                Discharge Planning   ANDREA:      Code Status: Full Code   Is the patient medically ready for discharge?:     Reason for patient still in hospital (select all that apply): Patient trending condition  Discharge Plan A: Skilled Nursing Facility                  Jane Leyva MD  Department of Hospital Medicine   [facility]

## 2023-10-12 NOTE — ASSESSMENT & PLAN NOTE
Patient has Abnormal Magnesium: hypomagnesemia. Will continue to monitor electrolytes closely. Will replace the affected electrolytes and repeat labs to be done after interventions completed. The patient's magnesium results have been reviewed and are listed below.  Recent Labs   Lab 10/12/23  0523   MG 1.5*

## 2023-10-12 NOTE — ASSESSMENT & PLAN NOTE
Frequent PVCs in a patient with multiple risk factors for coronary artery disease and recent syncope.  Differential diagnosis of syncope is multifactorial including dehydration.  But because of frequent PVCs, ventricular arrhythmia can not be ruled out.  Check a stress test to rule out large areas of ischemia.  Keep potassium around 4 and magnesium around 2.  Event monitor as an outpatient.  Continue tele monitoring as an inpatient    10/12: stress test today

## 2023-10-12 NOTE — PLAN OF CARE
All updated clinicals sent via careport to all SNF agencies for review. Pending PT/OT notes to submit to insurance for review to determine if SNF will be approved.

## 2023-10-12 NOTE — PT/OT/SLP PROGRESS
Occupational Therapy      Patient Name:  Traci Blanca   MRN:  [id number]    Patient not seen today secondary to Testing/imaging (xray/CT/MRI) (nuclear medicine). Will follow-up later.    10/12/2023

## 2023-10-12 NOTE — PT/OT/SLP EVAL
Occupational Therapy Evaluation and Treatment    Name: Traci Blanca  MRN: [id number]  Admitting Diagnosis: Acute renal failure superimposed on stage 3 chronic kidney disease  Recent Surgery: * No surgery found *      Recommendations:     Discharge Recommendations: nursing facility, skilled  Level of Assistance Recommended: Intermittent assistance  Discharge Equipment Recommendations: none  Barriers to discharge: None    Assessment:     Traci Blanca is a 72 y.o. female with a medical diagnosis of Acute renal failure superimposed on stage 3 chronic kidney disease. She presents with performance deficits affecting function including weakness, impaired endurance, impaired sensation, impaired self care skills, impaired functional mobility, impaired balance, decreased coordination, decreased upper extremity function, decreased lower extremity function, decreased safety awareness, pain, decreased ROM, impaired skin.   The patient participated in OT eval with encouragement and education. The patient proved an extensive functional history and reports her desire to return to (I) living. The patient req total assist x2 person for bed mobility and W/C transfer. The patient was limited by right shldr and B knee pain and  her fear of falling. The patient will beenfit from SNF.    Rehab Prognosis: Good; patient would benefit from acute OT services to address these deficits and reach maximum level of function.    Plan:     Patient to be seen 4 x/week to address the above listed problems via self-care/home management, therapeutic activities, therapeutic exercises  Plan of Care Expires: 10/26/23  Plan of Care Reviewed with: patient    Subjective     Chief Complaint: right shoulder and knee pain prevent functional (I)  Patient Comments/Goals: to return to (I) living  Pain/Comfort:  Pain Rating 1:  (c/o right shoulder and right knee pain)  Pain Addressed 2: Reposition, Distraction, Cessation of Activity, Nurse notified  Pain Rating  Post-Intervention 2:  (pain meds requested)    Patients cultural, spiritual, Syn[employer] conflicts given the current situation: no    Social History:  Living Environment: Patient lives alone in a first floor apartment with ramped  Prior Level of Function: Prior to admission, patient was modified independent with ADLs using wheelchair for mobility. The patient was Mod I untili ~2 weeks ago when she fell and hurt her shldr. The patient has required assist from caregivers and friends.  Roles and Routines: Patient was working as  (limited)  prior to admission. 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 (The patient's spouse  in March)  Equipment Used at Home: wheelchair, bedside commode  DME owned (not currently used): none  Assistance Upon Discharge:  friends and neighbors    Objective:     Communicated with nurse prior to session. Patient found HOB elevated with PureWick, peripheral IV upon OT entry to room.    General Precautions: Standard, fall   Orthopedic Precautions: N/A   Braces: N/A    Respiratory Status: Room air    Occupational Performance    Gait belt applied - Yes    Bed Mobility:   Rolling/Turning to Left with dependence  Rolling/Turning to Right with dependence  Scooting to HOB in supine: dependence, of 2 persons, and with drawsheet  Scooting anteriorly to EOB to have both feet planted on floor: maximal assistance  Supine to sit from right side of bed with maximal assistance, dependence, and of 2 persons  Sit to Supine with dependence and of 2 persons on right side of bed    Functional Mobility/Transfers:  Bed <> Chair Transfer using Scoot Pivot technique with dependent and 2 persons with drop arm W/C  Functional Mobility: The patient was dependent for placement of the W/C to the transfer surface and to manage locking/unlocking of the brakes.  The patient req min/mod assist form Wc propulsion, using her RUE and right leg for W/C mobility. The patient was unable to reach  the breaks 2* broken/bent brakes.    Activities of Daily Living:  Feeding: The patient refused to eat  Upper Body Dressing: maximal assistance and dependence to don/doff gown while seated on the EOB. The patient was able to verbalized understanding of maia dressing technique.  Lower Body Dressing: total assistance  Toileting: PureWick and diaper in use    Cognitive/Visual Perceptual:  Cognitive/Psychosocial Skills:    -     Oriented to: Person, Place, Time, Situation  -     Follows Commands/attention: Follows one-step commands and Follows two-step commands  -     Communication: clear/fluent  -     Memory: No Deficits noted  -     Safety awareness/insight to disability: impaired  -     Mood/Affect/Coping skills/emotional control: Appropriate to situation, Anxious, Agitated, and fearful of falling  Visual/Perceptual:    -     Intact    Physical Exam:  Balance:    -     Sitting: stand by assistance and contact guard assistance  -     Standing: does not stand or amb at her baseline  Postural examination/scapula alignment:    -       Rounded shoulders  -       Forward head  Skin integrity: bruising to B flank, foam dressing to sacral wound  Edema:  right knee  Sensation:    -       Intact  Dominant hand: Right  Upper Extremity Range of Motion:     -       Right Upper Extremity: WFL  -       Left Upper Extremity: limited throughout. The patient postures with the left arm in abduction, elbow in extension, wrist flexed and hand fisted  Upper Extremity Strength:    -       Right Upper Extremity: WFL  -       Left Upper Extremity: slight AROM in all joints   Strength:    -       Right Upper Extremity: WFL  -       Left Upper Extremity: slight   Fine Motor Coordination:    -       Intact  Right hand thumb/finger opposition skills    AMPAC 6 Click ADL:  AMPAC Total Score: 13    Treatment & Education:  Patient educated on role of OT, POC, and goals for therapy  Discussed patient goals   Sat on the EOB ~20 min with  SBA/CGA  Propelled W/C in the hallway with min/mod assist  Educated the patient re: proper positioning in bed to allow rhe patient to feed herself and swallow food safely        Patient left HOB elevated with all lines intact, call button in reach, and RN notified.    GOALS:   Multidisciplinary Problems       Occupational Therapy Goals          Problem: Occupational Therapy    Goal Priority Disciplines Outcome Interventions   Occupational Therapy Goal     OT, PT/OT Ongoing, Progressing    Description: Goals to be met by: 10/26/23     Patient will increase functional independence with ADLs by performing:    Feeding with Modified [place] and Set-up Assistance.  UE Dressing with Contact Guard Assistance.  LE Dressing with Contact Guard Assistance.  Grooming while seated at sink with Modified [place] and Supervision.  Toileting from bedside commode with Set-up Assistance and Supervision for hygiene and clothing management.   Sitting at edge of bed x30 minutes with Modified [place].  Rolling to Bilateral with Modified [place] and Supervision.   Supine to sit with Modified [place], Set-up Assistance, and use of bedrail as needed.  Squat pivot transfers with Stand-by Assistance.  Toilet transfer to bedside commode with Stand-by Assistance.  Upper extremity exercise program x15 reps per handout, with independence.                         History:     No past medical history on file.    No past surgical history on file.    Time Tracking:     OT Date of Treatment: 10/12/23  OT Start Time: 1342  OT Stop Time: 1442  OT Total Time (min): 60 min    Billable Minutes: Evaluation 15, Self Care/Home Management 15 (with PT)    10/12/2023

## 2023-10-12 NOTE — PLAN OF CARE
Problem: Adult Inpatient Plan of Care  Goal: Plan of Care Review  Outcome: Ongoing, Progressing  Flowsheets (Taken 10/12/2023 0628)  Plan of Care Reviewed With: patient  Goal: Optimal Comfort and Wellbeing  Outcome: Ongoing, Progressing     Problem: Skin Injury Risk Increased  Goal: Skin Health and Integrity  Outcome: Ongoing, Progressing  Intervention: Optimize Skin Protection  Flowsheets (Taken 10/12/2023 0628)  Pressure Reduction Devices: foam padding utilized  Skin Protection: adhesive use limited  Head of Bed (HOB) Positioning: HOB elevated     Problem: Fluid and Electrolyte Imbalance (Acute Kidney Injury/Impairment)  Goal: Fluid and Electrolyte Balance  Outcome: Ongoing, Progressing     Problem: Impaired Wound Healing  Goal: Optimal Wound Healing  Outcome: Ongoing, Progressing

## 2023-10-12 NOTE — NURSING
[facility]  Nurses Note -- 4 Eyes    Received pt awake on bed. On RA not in distress. With L sided weakness noted, contracted LUE, able to lift RLE but in 2-3s only. With pain noted on her LUE, medication offered but pt refused. Repositioned done for comfort. Refused  heparin due. With ongoing NA bicarb at 100ml/hr on her 20g R AC, infusing well. With altered skin integrity on her sacrum blanchable with foam dressing noted. Pt need attended. HOB elevated. Call light within reach. Will continue to monitor.  10/11/2023       Skin assessed on: Q Shift      [] No Pressure Injuries Present    []Prevention Measures Documented    [x] Yes LDA  for Pressure Injury Previously documented     [] Yes New Pressure Injury Discovered   [] LDA for New Pressure Injury Added      Attending RN:  Francesca Romeo RN     Second RN:  Cielo Bhatt RN

## 2023-10-12 NOTE — PLAN OF CARE
Problem: Adult Inpatient Plan of Care  Goal: Plan of Care Review  Outcome: Ongoing, Progressing  Flowsheets (Taken 10/12/2023 1611)  Plan of Care Reviewed With: patient  Goal: Patient-Specific Goal (Individualized)  Outcome: Ongoing, Progressing  Goal: Absence of Hospital-Acquired Illness or Injury  Outcome: Ongoing, Progressing  Intervention: Identify and Manage Fall Risk  Flowsheets (Taken 10/12/2023 1611)  Safety Promotion/Fall Prevention:   assistive device/personal item within reach   bed alarm set   side rails raised x 2   nonskid shoes/socks when out of bed   medications reviewed   room near unit station  Intervention: Prevent Skin Injury  Flowsheets (Taken 10/12/2023 1611)  Body Position: weight shifting  Intervention: Prevent and Manage VTE (Venous Thromboembolism) Risk  Flowsheets (Taken 10/12/2023 1611)  Activity Management: Rolling - L1  VTE Prevention/Management: intravenous hydration  Intervention: Prevent Infection  Flowsheets (Taken 10/12/2023 1611)  Infection Prevention: single patient room provided  Goal: Optimal Comfort and Wellbeing  Outcome: Ongoing, Progressing  Intervention: Monitor Pain and Promote Comfort  Flowsheets (Taken 10/12/2023 1611)  Pain Management Interventions:   quiet environment facilitated   pain management plan reviewed with patient/caregiver  Intervention: Provide Person-Centered Care  Flowsheets (Taken 10/12/2023 1611)  Trust Relationship/Rapport: care explained  Goal: Readiness for Transition of Care  Outcome: Ongoing, Progressing     Problem: Renal Function Impairment (Acute Kidney Injury/Impairment)  Goal: Effective Renal Function  Outcome: Ongoing, Progressing  Intervention: Monitor and Support Renal Function  Flowsheets (Taken 10/12/2023 1611)  Medication Review/Management:   medications reviewed   infusion initiated     Problem: Impaired Wound Healing  Goal: Optimal Wound Healing  Outcome: Ongoing, Progressing  Intervention: Promote Wound Healing  Flowsheets (Taken  10/12/2023 1611)  Oral Nutrition Promotion: rest periods promoted  Sleep/Rest Enhancement: regular sleep/rest pattern promoted  Activity Management: Rolling - L1  Pain Management Interventions:   quiet environment facilitated   pain management plan reviewed with patient/caregiver

## 2023-10-12 NOTE — SUBJECTIVE & OBJECTIVE
Interval History:  Stress test today    No past medical history on file.    No past surgical history on file.    Review of patient's allergies indicates:  No Known Allergies    No current facility-administered medications on file prior to encounter.     Current Outpatient Medications on File Prior to Encounter   Medication Sig    cyanocobalamin (VITAMIN B-12) 100 MCG tablet Take 100 mcg by mouth once daily.    ergocalciferol (ERGOCALCIFEROL) 50,000 unit Cap Take 1 capsule by mouth Daily.    telmisartan (MICARDIS) 80 MG Tab Take 80 mg by mouth Daily.     Family History    None       Tobacco Use    Smoking status: Not on file    Smokeless tobacco: Not on file   Substance and Sexual Activity    Alcohol use: Not on file    Drug use: Not on file    Sexual activity: Not on file     Review of Systems   Constitutional: Positive for malaise/fatigue.   HENT: Negative.     Eyes: Negative.    Cardiovascular:  Positive for near-syncope and syncope.   Respiratory: Negative.     Endocrine: Negative.    Hematologic/Lymphatic: Negative.    Skin: Negative.    Musculoskeletal: Negative.    Gastrointestinal: Negative.    Genitourinary: Negative.    Psychiatric/Behavioral: Negative.     Allergic/Immunologic: Negative.      Objective:     Vital Signs (Most Recent):  Temp: 97.5 °F (36.4 °C) (10/12/23 1129)  Pulse: 86 (10/12/23 1129)  Resp: 20 (10/12/23 1129)  BP: 104/64 (10/12/23 1129)  SpO2: 96 % (10/12/23 1129) Vital Signs (24h Range):  Temp:  [97.4 °F (36.3 °C)-98.6 °F (37 °C)] 97.5 °F (36.4 °C)  Pulse:  [42-89] 86  Resp:  [18-20] 20  SpO2:  [92 %-98 %] 96 %  BP: (104-126)/(56-66) 104/64     Weight: 72.7 kg (160 lb 5.1 oz)  Body mass index is 23 kg/m².    SpO2: 96 %         Intake/Output Summary (Last 24 hours) at 10/12/2023 1227  Last data filed at 10/12/2023 1127  Gross per 24 hour   Intake 2706.44 ml   Output 1100 ml   Net 1606.44 ml         Lines/Drains/Airways       Peripheral Intravenous Line  Duration                  Peripheral  "IV - Single Lumen 10/11/23 0941 22 G Posterior;Right Hand 1 day                     Physical Exam  Constitutional:       Appearance: Normal appearance. She is well-developed.   HENT:      Head: Normocephalic.   Eyes:      Pupils: Pupils are equal, round, and reactive to light.   Cardiovascular:      Rate and Rhythm: Normal rate and regular rhythm. Extrasystoles are present.  Pulmonary:      Effort: Pulmonary effort is normal.      Breath sounds: Normal breath sounds.   Abdominal:      General: Bowel sounds are normal.      Palpations: Abdomen is soft.      Tenderness: There is no abdominal tenderness.   Musculoskeletal:         General: Normal range of motion.      Cervical back: Normal range of motion and neck supple.   Skin:     General: Skin is warm.   Neurological:      Mental Status: She is alert and oriented to person, place, and time.          Significant Labs: BMP:   Recent Labs   Lab 10/10/23  1714 10/11/23  0548 10/12/23  0523    88 116*   * 134* 136   K 4.4 4.2 4.5    108 104   CO2 15* 14* 24   BUN 78* 67* 51*   CREATININE 2.6* 2.3* 1.8*   CALCIUM 9.4 9.0 8.4*   MG 1.8  --  1.5*     , CMP   Recent Labs   Lab 10/10/23  1714 10/11/23  0548 10/12/23  0523   * 134* 136   K 4.4 4.2 4.5    108 104   CO2 15* 14* 24    88 116*   BUN 78* 67* 51*   CREATININE 2.6* 2.3* 1.8*   CALCIUM 9.4 9.0 8.4*   PROT 7.0 6.8  --    ALBUMIN 3.3* 3.1*  --    BILITOT 0.6 0.4  --    ALKPHOS 104 102  --    AST 11 13  --    ALT 7* 8*  --    ANIONGAP 13 12 8     , CBC   Recent Labs   Lab 10/10/23  1714 10/11/23  0548 10/12/23  0523   WBC 4.85 4.79 4.38   HGB 11.0* 12.1 10.0*   HCT 34.4* 38.4 31.5*    245 SEE COMMENT     , INR   Recent Labs   Lab 10/10/23  1714   INR 1.0     , Lipid Panel No results for input(s): "CHOL", "HDL", "LDLCALC", "TRIG", "CHOLHDL" in the last 48 hours., Troponin   Recent Labs   Lab 10/10/23  1714   TROPONINI 0.011     , and All pertinent lab results from the last 24 " hours have been reviewed.    Significant Imaging: Echocardiogram: Transthoracic echo (TTE) complete (Cupid Only):   Results for orders placed or performed during the hospital encounter of 10/10/23   Echo   Result Value Ref Range    LVOT stroke volume 68.50 cm3    LVIDd 4.19 3.5 - 6.0 cm    LV Systolic Volume 35.66 mL    LVIDs 3.02 2.1 - 4.0 cm    LV Diastolic Volume 78.00 mL    IVS 1.06 0.6 - 1.1 cm    LVOT diameter 2.01 cm    LVOT area 3.2 cm2    FS 28 28 - 44 %    Left Ventricle Relative Wall Thickness 0.53 cm    Posterior Wall 1.12 (A) 0.6 - 1.1 cm    LV mass 154.45 g    MV Peak E Chris 0.61 m/s    TDI LATERAL 0.09 m/s    TDI SEPTAL 0.03 m/s    E/E' ratio 10.17 m/s    MV Peak A Chris 0.73 m/s    TR Max Chris 1.89 m/s    E/A ratio 0.84     E wave deceleration time 184.18 msec    LV SEPTAL E/E' RATIO 20.33 m/s    LV LATERAL E/E' RATIO 6.78 m/s    LVOT peak chris 1.17 m/s    Left Ventricular Outflow Tract Mean Velocity 0.75 cm/s    Left Ventricular Outflow Tract Mean Gradient 2.65 mmHg    LA size 2.82 cm    Left Atrium Minor Axis 4.83 cm    Left Atrium Major Axis 4.19 cm    RVDD 3.34 cm    RV S' 16.59 cm/s    TAPSE 1.90 cm    RA Major Axis 4.09 cm    AV regurgitation pressure 1/2 time [id number] ms    AR Max Chris 3.98 m/s    AV mean gradient 4 mmHg    AV peak gradient 6 mmHg    Ao peak chris 1.26 m/s    Ao VTI 22.10 cm    LVOT peak VTI 21.60 cm    AV valve area 3.10 cm²    AV Velocity Ratio 0.93     AV index (prosthetic) 0.98     EVARISTO by Velocity Ratio 2.94 cm²    MV mean gradient 1 mmHg    MV peak gradient 2 mmHg    MV stenosis pressure 1/2 time 53.41 ms    MV valve area p 1/2 method 4.12 cm2    MV valve area by continuity eq 4.31 cm2    MV VTI 15.9 cm    Triscuspid Valve Regurgitation Peak Gradient 14 mmHg    PV PEAK VELOCITY 0.67 m/s    PV peak gradient 2 mmHg    Sinus 3.77 cm    STJ 3.28 cm    Ascending aorta 4.64 cm    IVC diameter 1.82 cm    Mean e' 0.06 m/s    LA volume 26.89 cm3    LA WIDTH 2.5 cm    RA Width  3.5 cm    TV resting pulmonary artery pressure 17 mmHg    RV TB RVSP 5 mmHg    Est. RA pres 3 mmHg    Narrative      Left Ventricle: Normal wall motion. There is normal systolic function   with a visually estimated ejection fraction of 65 - 70%. Grade I diastolic   dysfunction.    Left Atrium: Left atrium is mildly dilated.    Right Ventricle: Normal right ventricular cavity size. Systolic   function is normal.    Aortic Valve: There is mild aortic regurgitation.    Aorta: Ascending aorta is moderately dilated measuring 4.64 cm.    Pulmonary Artery: The estimated pulmonary artery systolic pressure is   17 mmHg.    IVC/SVC: Normal venous pressure at 3 mmHg.

## 2023-10-12 NOTE — HPI
Patient is a pleasant 72-year-old lady.  Admitted for dizziness.  As well as syncopal episodes.  Cardiology has been consulted because of frequent bigeminy and trigeminy noted on tele monitoring.  Patient denies any chest pains at rest on exertion, orthopnea, PND             Dizziness       Pt presents to the ED via NO EMS  PT has complaints of intermittent dizziness, fecal / urinary incontinence, non healing wound to buttocks and diarrhea  Per EMS the patient was hypotensive upon arrival in 70/44  EMS administered 1 liter of LR pts /70 at triage  Pt has left sided paralysis (pt leaves at [facility] alone )  Pt Alert at [facility]         HPI: Traci Blanca 72 y.o. female with history of CVA with residual left sided deficits, CKD3, history of PE no longer on anticoagulation presents to the hospital with a chief complaint of syncope.  She reports multiple syncopal episodes over the last week which she attributes to dehydration.  She reports she is been unable to eat for the last week as she was unable to care for her activities of daily since a fall and resultant right shoulder pain 2 weeks ago.  She reports she was examined at a previous hospital.  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  She denies fever chest pain shortness of breath nausea vomiting abdominal leg swelling melena hematuria hematemesis dizziness.  She reports she was unable to the tolerate Eliquis for previously diagnosed PE due to bleeding complications.     In the ED, chest x-ray with prominence of the aortic arch correlation creatinine 2.6 lactic acid negative troponin negative BNP negative.

## 2023-10-12 NOTE — PLAN OF CARE
Problem: SLP  Goal: SLP Goal  Description: Goals:  1. Pt will tolerate a regular diet with thin liquids w/o any overt s/s of aspiration across x2 consecutive sessions (1 of 2 met 10/12).     Outcome: Ongoing, Progressing    B/s swallow eval completed. ST recs regular diet thin liquids. Meds whole.

## 2023-10-12 NOTE — ASSESSMENT & PLAN NOTE
Presents with Cr of 2.6, baseline of 1.9 to 2.1 suspect related to decreased PO intake as unable to eat due to baseline paralysis and right sided shoulder pain now limit ADLs  Started on IVF  Renal dose medications, avoid nephrotoxic drugs, monitor intake and output  Home telmisartan held        Patient with acute kidney injury/acute renal failure likely due to pre-renal azotemia due to dehydration WALT is currently stable. Baseline creatinine 1.9 to 2.1 - Labs reviewed- Renal function/electrolytes with Estimated Creatinine Clearance: 30.6 mL/min (A) (based on SCr of 1.8 mg/dL (H)). according to latest data. Monitor urine output and serial BMP and adjust therapy as needed. Avoid nephrotoxins and renally dose meds for GFR listed above.  Changed  IVF  to bicarb drip for worsening acidosis.  Check bladder  Scan.  Improved.

## 2023-10-12 NOTE — SUBJECTIVE & OBJECTIVE
No past medical history on file.    No past surgical history on file.    Review of patient's allergies indicates:  No Known Allergies    No current facility-administered medications on file prior to encounter.     Current Outpatient Medications on File Prior to Encounter   Medication Sig    cyanocobalamin (VITAMIN B-12) 100 MCG tablet Take 100 mcg by mouth once daily.    ergocalciferol (ERGOCALCIFEROL) 50,000 unit Cap Take 1 capsule by mouth Daily.    telmisartan (MICARDIS) 80 MG Tab Take 80 mg by mouth Daily.     Family History    None       Tobacco Use    Smoking status: Not on file    Smokeless tobacco: Not on file   Substance and Sexual Activity    Alcohol use: Not on file    Drug use: Not on file    Sexual activity: Not on file     Review of Systems   Constitutional:  Negative for chills and fever.   HENT:  Negative for nosebleeds and tinnitus.    Eyes:  Negative for photophobia and visual disturbance.   Respiratory:  Negative for shortness of breath and wheezing.    Cardiovascular:  Negative for chest pain, palpitations and leg swelling.   Gastrointestinal:  Negative for abdominal distention, nausea and vomiting.   Genitourinary:  Negative for dysuria, flank pain and hematuria.   Musculoskeletal:  Positive for arthralgias. Negative for gait problem and joint swelling.   Skin:  Negative for rash and wound.   Neurological:  Negative for seizures and syncope.     Objective:     Vital Signs (Most Recent):  Temp: 97.9 °F (36.6 °C) (10/12/23 0737)  Pulse: 70 (10/12/23 0737)  Resp: 20 (10/12/23 0737)  BP: (!) 107/56 (10/12/23 0737)  SpO2: 95 % (10/12/23 0737) Vital Signs (24h Range):  Temp:  [97.4 °F (36.3 °C)-98.6 °F (37 °C)] 97.9 °F (36.6 °C)  Pulse:  [42-89] 70  Resp:  [18-20] 20  SpO2:  [92 %-98 %] 95 %  BP: (107-126)/(56-66) 107/56     Weight: 72.7 kg (160 lb 5.1 oz)  Body mass index is 23 kg/m².     Physical Exam  Vitals and nursing note reviewed.   Constitutional:       General: She is not in acute distress.      Appearance: She is well-developed. She is not diaphoretic.   HENT:      Head: Normocephalic and atraumatic.      Right Ear: External ear normal.      Left Ear: External ear normal.   Eyes:      General:         Right eye: No discharge.         Left eye: No discharge.      Conjunctiva/sclera: Conjunctivae normal.   Neck:      Thyroid: No thyromegaly.   Cardiovascular:      Rate and Rhythm: Normal rate and regular rhythm.      Heart sounds: No murmur heard.  Pulmonary:      Effort: Pulmonary effort is normal. No respiratory distress.      Breath sounds: Normal breath sounds.   Abdominal:      General: Bowel sounds are normal. There is no distension.      Palpations: Abdomen is soft. There is no mass.      Tenderness: There is no abdominal tenderness.   Musculoskeletal:         General: No deformity.      Cervical back: Normal range of motion and neck supple.      Right lower leg: No edema.      Left lower leg: No edema.   Skin:     General: Skin is warm and dry.   Neurological:      Mental Status: She is alert and oriented to person, place, and time.      Sensory: No sensory deficit.      Comments: Right sided upper and lower paralysis. Baseline per patient since previous CVA   Psychiatric:         Mood and Affect: Mood normal.         Behavior: Behavior normal.                Significant Labs: CBC:   Recent Labs   Lab 10/10/23  1714 10/11/23  0548 10/12/23  0523   WBC 4.85 4.79 4.38   HGB 11.0* 12.1 10.0*   HCT 34.4* 38.4 31.5*    245 SEE COMMENT       CMP:   Recent Labs   Lab 10/10/23  1714 10/11/23  0548 10/12/23  0523   * 134* 136   K 4.4 4.2 4.5    108 104   CO2 15* 14* 24    88 116*   BUN 78* 67* 51*   CREATININE 2.6* 2.3* 1.8*   CALCIUM 9.4 9.0 8.4*   PROT 7.0 6.8  --    ALBUMIN 3.3* 3.1*  --    BILITOT 0.6 0.4  --    ALKPHOS 104 102  --    AST 11 13  --    ALT 7* 8*  --    ANIONGAP 13 12 8       Cardiac Markers:   Recent Labs   Lab 10/10/23  1714   BNP 86       Coagulation:   Recent  Labs   Lab 10/10/23  1714   INR 1.0       Lactic Acid:   Recent Labs   Lab 10/10/23  1714   LACTATE 2.2       Troponin:   Recent Labs   Lab 10/10/23  1714   TROPONINI 0.011       Urine Studies:   Recent Labs   Lab 10/10/23  1903   COLORU Yellow   APPEARANCEUA Hazy*   PHUR 5.0   SPECGRAV 1.010   PROTEINUA Trace*   GLUCUA Negative   KETONESU Negative   BILIRUBINUA Negative   OCCULTUA Trace*   NITRITE Negative   UROBILINOGEN Negative   LEUKOCYTESUR 3+*   RBCUA 2   WBCUA >100*   BACTERIA Moderate*         Significant Imaging:   Imaging Results               CT Chest Abdomen Pelvis Without Contrast (XPD) (Final result)  Result time 10/10/23 22:45:58      Final result by Vangie Heard MD (10/10/23 22:45:58)                   Impression:      Aneurysmal dilatation of the ascending thoracic aorta and common iliac arteries.  Moderate atherosclerotic changes.  If concern for dissection, consider additional imaging.    3 mm left apical pulmonary nodule.  For a solid nodule <6 mm, Fleischner Society 2017 guidelines recommend no routine follow up for a low risk patient, or follow-up with non-contrast chest CT at 12 months in a high risk patient.    Indeterminate low-attenuation left adrenal lesion.    Colonic diverticulosis.    Air-fluid level in the urinary bladder.  Differential considerations may include attempted instrumentation with a Fitzgerald catheter, fistulous formation, or gas-forming organism.    This report was flagged in Epic as abnormal.      Electronically signed by: Vangie Heard  Date:    10/10/2023  Time:    22:45               Narrative:    EXAMINATION:  CT CHEST ABDOMEN PELVIS WITHOUT    CLINICAL HISTORY:  Evaluation of syncope.  Hypotension onset 2 weeks ago.    TECHNIQUE:  1.25 mm unenhanced axial images from the lung apices through the greater trochanters were performed.  Coronal and sagittal reformatted images were provided.  No IV contrast was given.    COMPARISON:  None.    FINDINGS:  In the chest,  there is aneurysmal dilatation of the ascending thoracic aorta measuring up to 4.9 cm (series 2 axial image 69).  The thoracic aorta is tortuous.  There is moderate atherosclerotic changes.  A 3 mm pulmonary nodule is seen at the left lung apex (series 4 axial image 98).    Within the limits of a noncontrast examination, in the abdomen, there is no aneurysmal dilatation of the tortuous abdominal aorta.  There are moderate atherosclerotic changes.  The liver, spleen, pancreas, kidneys, and right adrenal gland are unremarkable.  The gallbladder is surgically absent.  There is a low-attenuation lobular left adrenal lesion measuring 2.0 x 1.8 cm (series 2 axial image 118). There is no gross abdominal adenopathy or ascites.  Moderate atherosclerotic disease is present.    In the pelvis, the right common iliac artery measures up to 2 cm (series 2 axial image 175).  The left common iliac artery measures up to 1.7 cm (series 2 axial image 179).  There are no pelvic masses or adenopathy.  There is no free pelvic fluid.  There is colonic diverticulosis.  There is a calcified uterine fibroid.  A small air-fluid level is seen in the urinary bladder.  Marginal and bridging osteophytes are present.                                        X-Ray Chest AP Portable (Final result)  Result time 10/10/23 18:27:04      Final result by Da Martinez MD (10/10/23 18:27:04)                   Impression:      This report was flagged in Epic as abnormal.    1. Interstitial findings may reflect underlying COPD/emphysema.  No large focal consolidation.  2. There is prominence of the aortic arch, correlation with previous exams advised, PA and lateral could be performed for further evaluation as warranted.      Electronically signed by: Da Martinez MD  Date:    10/10/2023  Time:    18:27               Narrative:    EXAMINATION:  XR CHEST AP PORTABLE    CLINICAL HISTORY:  hypotension;    TECHNIQUE:  Single frontal view of the chest was  performed.    COMPARISON:  None    FINDINGS:  The cardiomediastinal silhouette is not enlarged noting prominence of the aorta..  There is no pleural effusion.  The trachea is midline.  The lungs are symmetrically expanded bilaterally with coarse interstitial attenuation.  There are a few scattered calcified granuloma.  No large focal consolidation seen.  There is no pneumothorax.  The osseous structures are remarkable for degenerative change..                                       X-Ray Humerus 2 View Right (Final result)  Result time 10/10/23 18:05:56      Final result by Jade Rodriguez MD (10/10/23 18:05:56)                   Impression:      No acute findings.      Electronically signed by: Jade Rodriguez  Date:    10/10/2023  Time:    18:05               Narrative:    EXAMINATION:  XR HUMERUS 2 VIEW RIGHT    CLINICAL HISTORY:  Pain in arm, unspecified    TECHNIQUE:  Four views    COMPARISON:  None    FINDINGS:  Osteopenia.  No acute fracture or dislocation.  Mild glenohumeral and moderate acromioclavicular joint osteoarthritis.

## 2023-10-12 NOTE — ASSESSMENT & PLAN NOTE
Frequent PVCs in a patient with multiple risk factors for coronary artery disease and recent syncope.  Differential diagnosis of syncope is multifactorial including dehydration.  But because of frequent PVCs, ventricular arrhythmia can not be ruled out.  Check a stress test to rule out large areas of ischemia.  Keep potassium around 4 and magnesium around 2.  Event monitor as an outpatient.  Continue tele monitoring as an inpatient

## 2023-10-12 NOTE — CONSULTS
Consulted for sacral wound  Sacrum- No open wound. Scars on bilateral buttocks- tan discoloration with white pigmentation in center. Epidermis intact.   Perirectal skin clear. Versette for urine containment.  Wearing EHOB boots and positioned with pillows.   Left hand contracted. Having difficulty getting comfortable. States has pillows at home to position head. Having spasms on left side.   Encouraged movement for pressure injury prevention.   Nursing to continue Pressure Injury Prevention Interventions.

## 2023-10-13 PROBLEM — I77.810 AORTIC ROOT DILATATION: Status: ACTIVE | Noted: 2023-10-13

## 2023-10-13 PROBLEM — S31.000A WOUND OF SACRAL REGION: Status: ACTIVE | Noted: 2023-10-13

## 2023-10-13 LAB
ANION GAP SERPL CALC-SCNC: 6 MMOL/L (ref 8–16)
ANION GAP SERPL CALC-SCNC: 7 MMOL/L (ref 8–16)
APPEARANCE FLD: CLEAR
BASOPHILS # BLD AUTO: 0.02 K/UL (ref 0–0.2)
BASOPHILS NFR BLD: 0.5 % (ref 0–1.9)
BODY FLD TYPE: NORMAL
BODY FLD TYPE: NORMAL
BUN SERPL-MCNC: 40 MG/DL (ref 8–23)
BUN SERPL-MCNC: 41 MG/DL (ref 8–23)
CALCIUM SERPL-MCNC: 8.2 MG/DL (ref 8.7–10.5)
CALCIUM SERPL-MCNC: 8.4 MG/DL (ref 8.7–10.5)
CHLORIDE SERPL-SCNC: 106 MMOL/L (ref 95–110)
CHLORIDE SERPL-SCNC: 107 MMOL/L (ref 95–110)
CO2 SERPL-SCNC: 26 MMOL/L (ref 23–29)
CO2 SERPL-SCNC: 26 MMOL/L (ref 23–29)
COLOR FLD: YELLOW
CREAT SERPL-MCNC: 1.7 MG/DL (ref 0.5–1.4)
CREAT SERPL-MCNC: 1.8 MG/DL (ref 0.5–1.4)
CRYSTALS FLD MICRO: NEGATIVE
DIFFERENTIAL METHOD: ABNORMAL
EOSINOPHIL # BLD AUTO: 0.1 K/UL (ref 0–0.5)
EOSINOPHIL NFR BLD: 1.6 % (ref 0–8)
ERYTHROCYTE [DISTWIDTH] IN BLOOD BY AUTOMATED COUNT: 12.4 % (ref 11.5–14.5)
EST. GFR  (NO RACE VARIABLE): 30 ML/MIN/1.73 M^2
EST. GFR  (NO RACE VARIABLE): 32 ML/MIN/1.73 M^2
GLUCOSE SERPL-MCNC: 101 MG/DL (ref 70–110)
GLUCOSE SERPL-MCNC: 101 MG/DL (ref 70–110)
HCT VFR BLD AUTO: 29.6 % (ref 37–48.5)
HGB BLD-MCNC: 9.5 G/DL (ref 12–16)
IMM GRANULOCYTES # BLD AUTO: 0.01 K/UL (ref 0–0.04)
IMM GRANULOCYTES NFR BLD AUTO: 0.3 % (ref 0–0.5)
LYMPHOCYTES # BLD AUTO: 0.8 K/UL (ref 1–4.8)
LYMPHOCYTES NFR BLD: 22 % (ref 18–48)
LYMPHOCYTES NFR FLD MANUAL: 68 %
MAGNESIUM SERPL-MCNC: 1.8 MG/DL (ref 1.6–2.6)
MCH RBC QN AUTO: 30.7 PG (ref 27–31)
MCHC RBC AUTO-ENTMCNC: 32.1 G/DL (ref 32–36)
MCV RBC AUTO: 96 FL (ref 82–98)
MONOCYTES # BLD AUTO: 0.2 K/UL (ref 0.3–1)
MONOCYTES NFR BLD: 5.4 % (ref 4–15)
NEUTROPHILS # BLD AUTO: 2.6 K/UL (ref 1.8–7.7)
NEUTROPHILS NFR BLD: 70.2 % (ref 38–73)
NEUTROPHILS NFR FLD MANUAL: 32 %
NRBC BLD-RTO: 0 /100 WBC
PLATELET # BLD AUTO: 186 K/UL (ref 150–450)
PMV BLD AUTO: 9 FL (ref 9.2–12.9)
POTASSIUM SERPL-SCNC: 4 MMOL/L (ref 3.5–5.1)
POTASSIUM SERPL-SCNC: 4.7 MMOL/L (ref 3.5–5.1)
RBC # BLD AUTO: 3.09 M/UL (ref 4–5.4)
SODIUM SERPL-SCNC: 138 MMOL/L (ref 136–145)
SODIUM SERPL-SCNC: 140 MMOL/L (ref 136–145)
WBC # BLD AUTO: 3.73 K/UL (ref 3.9–12.7)
WBC # FLD: 1627 /CU MM

## 2023-10-13 PROCEDURE — 89051 BODY FLUID CELL COUNT: CPT | Performed by: ORTHOPAEDIC SURGERY

## 2023-10-13 PROCEDURE — 97110 THERAPEUTIC EXERCISES: CPT | Mod: 59,CQ

## 2023-10-13 PROCEDURE — 85025 COMPLETE CBC W/AUTO DIFF WBC: CPT | Performed by: HOSPITALIST

## 2023-10-13 PROCEDURE — 30200315 PPD INTRADERMAL TEST REV CODE 302: Performed by: HOSPITALIST

## 2023-10-13 PROCEDURE — 87075 CULTR BACTERIA EXCEPT BLOOD: CPT | Performed by: ORTHOPAEDIC SURGERY

## 2023-10-13 PROCEDURE — 92526 ORAL FUNCTION THERAPY: CPT

## 2023-10-13 PROCEDURE — 80048 BASIC METABOLIC PNL TOTAL CA: CPT | Mod: 91 | Performed by: HOSPITALIST

## 2023-10-13 PROCEDURE — 87070 CULTURE OTHR SPECIMN AEROBIC: CPT | Performed by: ORTHOPAEDIC SURGERY

## 2023-10-13 PROCEDURE — 86580 TB INTRADERMAL TEST: CPT | Performed by: HOSPITALIST

## 2023-10-13 PROCEDURE — 96375 TX/PRO/DX INJ NEW DRUG ADDON: CPT

## 2023-10-13 PROCEDURE — 89060 EXAM SYNOVIAL FLUID CRYSTALS: CPT | Performed by: ORTHOPAEDIC SURGERY

## 2023-10-13 PROCEDURE — 99213 PR OFFICE/OUTPT VISIT, EST, LEVL III, 20-29 MIN: ICD-10-PCS | Mod: ,,, | Performed by: INTERNAL MEDICINE

## 2023-10-13 PROCEDURE — 83735 ASSAY OF MAGNESIUM: CPT | Performed by: HOSPITALIST

## 2023-10-13 PROCEDURE — 99213 OFFICE O/P EST LOW 20 MIN: CPT | Mod: ,,, | Performed by: INTERNAL MEDICINE

## 2023-10-13 PROCEDURE — G0378 HOSPITAL OBSERVATION PER HR: HCPCS

## 2023-10-13 PROCEDURE — 80048 BASIC METABOLIC PNL TOTAL CA: CPT | Performed by: HOSPITALIST

## 2023-10-13 PROCEDURE — 25000003 PHARM REV CODE 250: Performed by: HOSPITALIST

## 2023-10-13 PROCEDURE — 63600175 PHARM REV CODE 636 W HCPCS: Performed by: HOSPITALIST

## 2023-10-13 PROCEDURE — 96361 HYDRATE IV INFUSION ADD-ON: CPT

## 2023-10-13 PROCEDURE — 25000003 PHARM REV CODE 250: Performed by: PHYSICIAN ASSISTANT

## 2023-10-13 PROCEDURE — 63600175 PHARM REV CODE 636 W HCPCS: Performed by: PHYSICIAN ASSISTANT

## 2023-10-13 PROCEDURE — 97530 THERAPEUTIC ACTIVITIES: CPT

## 2023-10-13 PROCEDURE — 36415 COLL VENOUS BLD VENIPUNCTURE: CPT | Performed by: HOSPITALIST

## 2023-10-13 PROCEDURE — 96366 THER/PROPH/DIAG IV INF ADDON: CPT

## 2023-10-13 PROCEDURE — 97535 SELF CARE MNGMENT TRAINING: CPT

## 2023-10-13 RX ORDER — DIAZEPAM 10 MG/2ML
1 INJECTION INTRAMUSCULAR ONCE
Status: COMPLETED | OUTPATIENT
Start: 2023-10-13 | End: 2023-10-13

## 2023-10-13 RX ORDER — ACETAMINOPHEN 325 MG/1
650 TABLET ORAL EVERY 4 HOURS PRN
Refills: 0
Start: 2023-10-13

## 2023-10-13 RX ADMIN — ACETAMINOPHEN 650 MG: 325 TABLET ORAL at 11:10

## 2023-10-13 RX ADMIN — CEFTRIAXONE 1 G: 1 INJECTION, POWDER, FOR SOLUTION INTRAMUSCULAR; INTRAVENOUS at 11:10

## 2023-10-13 RX ADMIN — ACETAMINOPHEN 650 MG: 325 TABLET ORAL at 05:10

## 2023-10-13 RX ADMIN — SODIUM CHLORIDE: 9 INJECTION, SOLUTION INTRAVENOUS at 09:10

## 2023-10-13 RX ADMIN — TUBERCULIN PURIFIED PROTEIN DERIVATIVE 5 UNITS: 5 INJECTION, SOLUTION INTRADERMAL at 04:10

## 2023-10-13 RX ADMIN — SODIUM CHLORIDE: 9 INJECTION, SOLUTION INTRAVENOUS at 08:10

## 2023-10-13 RX ADMIN — ACETAMINOPHEN 650 MG: 325 TABLET ORAL at 12:10

## 2023-10-13 RX ADMIN — DIAZEPAM 1 MG: 10 INJECTION, SOLUTION INTRAMUSCULAR; INTRAVENOUS at 07:10

## 2023-10-13 RX ADMIN — ACETAMINOPHEN 650 MG: 325 TABLET ORAL at 09:10

## 2023-10-13 NOTE — PLAN OF CARE
Message sent to Dr Woody to request d/c order and plan of care if pt is medically ready for d/c.  Accepting facility and auth complete.    Dr Woody advised that the pt was seen by Dr Mcclendon aspirated the patient's right knee and sent fluids for cultures to rule out septic joint.  Discharge will be on hold till cultures are resulted    Call placed to Tita with [facility] to advise that d/c is on hold.

## 2023-10-13 NOTE — PLAN OF CARE
Spoke to pt to go over accepting facilities with her.  At this time the pt reports the only facility she does not want to go to is [facility].  TN at this time advised that once we confirm an accepting facility we will submit to insurance company to obtain auth if approved she will likely d/c later today if medically stable. TN to follow up with pt after seen by MD.

## 2023-10-13 NOTE — PROGRESS NOTES
[facility]  Cardiology  Progress Note    Patient Name: Traci Blanca  MRN: [id number]  Admission Date: 10/10/2023  Hospital Length of Stay: 0 days  Code Status: Full Code   Attending Physician: Jane Leyva, *   Primary Care Physician: Tsering, Primary Doctor  Expected Discharge Date:   Principal Problem:Acute renal failure superimposed on stage 3 chronic kidney disease    Subjective:     Interval Hx: no cp/sob.  Pt refused stress test yesterday (uncomfortable laying flat).  Now eating breakfast.    Tele: SR 70s, PVCs (personally reviewed and interpreted)          Review of Systems   Gastrointestinal:  Negative for melena.   Genitourinary:  Negative for hematuria.     Objective:     Vital Signs (Most Recent):  Temp: 97.9 °F (36.6 °C) (10/13/23 0806)  Pulse: (!) 45 (10/13/23 0806)  Resp: 18 (10/13/23 0806)  BP: 137/66 (10/13/23 0806)  SpO2: 96 % (10/13/23 0806) Vital Signs (24h Range):  Temp:  [97.5 °F (36.4 °C)-97.9 °F (36.6 °C)] 97.9 °F (36.6 °C)  Pulse:  [41-96] 45  Resp:  [18-20] 18  SpO2:  [96 %-97 %] 96 %  BP: ()/(54-66) 137/66     Weight: 72.7 kg (160 lb 5.1 oz)  Body mass index is 23 kg/m².     SpO2: 96 %         Intake/Output Summary (Last 24 hours) at 10/13/2023 0809  Last data filed at 10/13/2023 0654  Gross per 24 hour   Intake 2586.44 ml   Output 500 ml   Net 2086.44 ml       Lines/Drains/Airways       Peripheral Intravenous Line  Duration                  Peripheral IV - Single Lumen 10/11/23 0941 22 G Posterior;Right Hand 1 day                       Physical Exam  Constitutional:       General: She is not in acute distress.     Appearance: Normal appearance. She is well-developed and normal weight. She is not ill-appearing, toxic-appearing or diaphoretic.   HENT:      Head: Normocephalic and atraumatic.   Eyes:      General: No scleral icterus.     Extraocular Movements: Extraocular movements intact.      Conjunctiva/sclera: Conjunctivae normal.      Pupils: Pupils are equal,  round, and reactive to light.   Neck:      Vascular: No JVD.      Trachea: No tracheal deviation.   Cardiovascular:      Rate and Rhythm: Normal rate and regular rhythm. Frequent Extrasystoles are present.     Heart sounds: S1 normal and S2 normal. No murmur heard.     No friction rub. No gallop.   Pulmonary:      Effort: Pulmonary effort is normal. No respiratory distress.      Breath sounds: Normal breath sounds. No stridor. No wheezing, rhonchi or rales.   Chest:      Chest wall: No tenderness.   Abdominal:      General: There is no distension.      Palpations: Abdomen is soft.   Musculoskeletal:         General: No swelling or tenderness. Normal range of motion.      Cervical back: Normal range of motion and neck supple. No rigidity.      Right lower leg: No edema.      Left lower leg: No edema.   Skin:     General: Skin is warm and dry.      Coloration: Skin is not jaundiced.   Neurological:      General: No focal deficit present.      Mental Status: She is alert and oriented to person, place, and time.      Cranial Nerves: No cranial nerve deficit.   Psychiatric:         Mood and Affect: Mood normal.         Behavior: Behavior normal.            Current Medications:   cefTRIAXone (ROCEPHIN) IVPB  1 g Intravenous Q24H    heparin (porcine)  5,000 Units Subcutaneous Q8H    regadenoson  0.4 mg Intravenous Once    tuberculin  5 Units Intradermal Once      sodium chloride 0.9% 75 mL/hr at 10/13/23 0809     acetaminophen, acetaminophen, glucagon (human recombinant), glucose, glucose, magnesium oxide, magnesium oxide, melatonin, naloxone, senna-docusate 8.6-50 mg, sodium chloride 0.9%    Laboratory (all labs reviewed):  CBC:  Recent Labs   Lab 09/07/22  1216 10/10/23  1714 10/11/23  0548 10/12/23  0523 10/13/23  0553   WBC TNTC A 4.85 4.79 4.38 3.73 L   Hemoglobin  --  11.0 L 12.1 10.0 L 9.5 L   Hematocrit  --  34.4 L 38.4 31.5 L 29.6 L   Platelets  --  269 245 SEE COMMENT 186       CHEMISTRIES:  Recent Labs    Lab 10/10/23  1714 10/11/23  0548 10/12/23  0523 10/13/23  0553 10/13/23  0620   Glucose 100 88 116 H 101 101   Sodium 132 L 134 L 136 140 138   Potassium 4.4 4.2 4.5 4.7 4.0   BUN 78 H 67 H 51 H 40 H 41 H   Creatinine 2.6 H 2.3 H 1.8 H 1.8 H 1.7 H   eGFR 19 A 22 A 30 A 30 A 32 A   Calcium 9.4 9.0 8.4 L 8.4 L 8.2 L   Magnesium 1.8  --  1.5 L 1.8  --        CARDIAC BIOMARKERS:  Recent Labs   Lab 10/10/23  1714   Troponin I 0.011       COAGS:  Recent Labs   Lab 10/10/23  1714   INR 1.0       LIPIDS/LFTS:  Recent Labs   Lab 09/08/22  0525 09/09/22  0612 09/10/22  0359 10/10/23  1714 10/11/23  0548   AST 17 15 13 11 13   ALT 8 L <7 L 8 L 7 L 8 L       BNP:  Recent Labs   Lab 10/10/23  1714   BNP 86       TSH:        Free T4:        Diagnostic Results:  Echo: 10/11/23    Left Ventricle: Normal wall motion. There is normal systolic function with a visually estimated ejection fraction of 65 - 70%. Grade I diastolic dysfunction.    Left Atrium: Left atrium is mildly dilated.    Right Ventricle: Normal right ventricular cavity size. Systolic function is normal.    Aortic Valve: There is mild aortic regurgitation.    Aorta: Ascending aorta is moderately dilated measuring 4.64 cm.    Pulmonary Artery: The estimated pulmonary artery systolic pressure is 17 mmHg.    IVC/SVC: Normal venous pressure at 3 mmHg.    Stress Test: pt refused      Assessment and Plan:     * Acute renal failure superimposed on stage 3 chronic kidney disease  Stable  Mgmt per IM      Syncope  No recurrence  No sustained arrhythmia on tele  Pt refused MPI 10/12/23      History of CVA (cerebrovascular accident)  Consider statin      PVC (premature ventricular contraction)  Frequent PVCs in a patient with multiple risk factors for coronary artery disease and recent syncope.  Differential diagnosis of syncope is multifactorial including dehydration.  But because of frequent PVCs, ventricular arrhythmia can not be ruled out.  Check a stress test to  rule out large areas of ischemia.  Keep potassium around 4 and magnesium around 2.  Event monitor as an outpatient.  Continue tele monitoring as an inpatient    10/12: pt refused stress test (uncomfortable laying flat but also with GI complaints, does not appear to be vol overloaded).    10/13: now eating breakfast.  No arrhythmia on tele.  EF normal on echo.  Consider outpat stress test and EVM.      Aortic root dilatation  4.6cm on echo 10/2023  Repeat echo in 6 months (Apr 2024).        VTE Risk Mitigation (From admission, onward)         Ordered     heparin (porcine) injection 5,000 Units  Every 8 hours         10/11/23 1010     IP VTE HIGH RISK PATIENT  Once         10/10/23 2012     Place sequential compression device  Until discontinued         10/10/23 2012     Place CALE hose  Until discontinued         10/10/23 2012              Cardiology will sign off, pls call with questions.  Pt to follow up with Dr. Kebede after discharge.    Evaristo Mesa MD  Cardiology  [facility]

## 2023-10-13 NOTE — CONSULTS
Chief Complaint:  Knee effusion    History of Present Illness:  Traci Blanca is a very pleasant 72 y.o. female who presents with right knee effusion for 1-2 days.  The patient reports a history of severe right knee osteoarthritis she denies any fevers or chills pain she reports a twisting injury approximately 3 weeks ago.  Of note she reports she has not been ambulatory in any significant way for approximately 19 years.  She reports mild-to-moderate pain in her right knee she does report a history of gout many years ago.  She also has a history of a cerebrovascular accident with residual weakness      Review of Systems:    Noncontributory except for above    CKD 3, pulmonary embolism, CVA    Past Surgical History:   No past surgical history on file.    Social History:  negative tobacco abuse    Allergies:  Review of patient's allergies indicates:  No Known Allergies    Medications:  Current Facility-Administered Medications   Medication Dose Route Frequency Provider Last Rate Last Admin    0.9%  NaCl infusion   Intravenous Continuous Jane Leyva MD 75 mL/hr at 10/13/23 0809 New Bag at 10/13/23 0809    acetaminophen tablet 650 mg  650 mg Oral Q4H PRN Aubrey Hargrove PA-C   650 mg at 10/13/23 0020    acetaminophen tablet 650 mg  650 mg Oral Q6H PRN Jane Leyva MD   650 mg at 10/12/23 1458    cefTRIAXone (ROCEPHIN) 1 g in dextrose 5 % in water (D5W) 100 mL IVPB (MB+)  1 g Intravenous Q24H Jane Leyva MD   Stopped at 10/12/23 2334    glucagon (human recombinant) injection 1 mg  1 mg Intramuscular PRN ShowAubrey gillis PA-C        glucose chewable tablet 16 g  16 g Oral PRN ShowAubrey gillis PA-C        glucose chewable tablet 24 g  24 g Oral PRN Aubrey Hargrove PA-C        heparin (porcine) injection 5,000 Units  5,000 Units Subcutaneous Q8H Jane Leyva MD        magnesium oxide tablet 800 mg  800 mg Oral PRN Aubrey Hargrove PA-C        magnesium oxide tablet 800 mg   800 mg Oral PRN ShowAubrey gillis PA-C        melatonin tablet 6 mg  6 mg Oral Nightly PRN ShowAubrey gillis PA-C        naloxone 0.4 mg/mL injection 0.02 mg  0.02 mg Intravenous PRN Aubrey Hargrove PA-C        regadenoson injection 0.4 mg  0.4 mg Intravenous Once Kelsey Kebede MD        senna-docusate 8.6-50 mg per tablet 1 tablet  1 tablet Oral Daily PRN ShowAubrey gillis PA-C        sodium chloride 0.9% flush 10 mL  10 mL Intravenous PRN ShowAubrey gillis PA-C        tuberculin injection 5 Units  5 Units Intradermal Once Jane Leyva MD           Physical Exam:   General:  Well developed and well nourished age appropriate female in no acute distress  Cardiovascular: regular rate and rhythm  Respiratory:  Nonlabored breathing, no wheezing  Abdomen: soft, non-tender, non-distended  Musculoskeletal:   Right knee significant effusion no erythema no warmth limited motion, patient reports this is chronic, sensation motor intact throughout palpable pulse.  Patient has difficulty with extension  Neuro:  Sensation intact    Imaging:  Imaging revealed:  Severe degenerative changes with erosive destructive changes of the joint line no radiographically apparent acute pathology    Diagnosis:  72-year-old female, nonambulatory with a history of CKD 3 and a strokes as well as history of pulmonary embolism with a large right knee effusion.    Plan:   Aspirated the patient's right knee and sent for aerobic anaerobic cell count and crystal analysis as the patient does report a history of gout.  Clinically my suspicion for infection is very low.    Will follow      Procedure note:  Verbal consent using typical sterile precautions the patient's right knee was aspirated of in excess of 300 cc of normal-appearing joint fluid and sent for aerobic anaerobic cell count and crystal analysis.    Blood loss none complications none patient tolerated well    Okay to have a regular diet    Warner Mcclendon MD  [facility]

## 2023-10-13 NOTE — PT/OT/SLP PROGRESS
Occupational Therapy   Treatment    Name: Traci Blanca  MRN: [id number]  Admitting Diagnosis:  Acute renal failure superimposed on stage 3 chronic kidney disease       Recommendations:     Discharge Recommendations: nursing facility, skilled  Discharge Equipment Recommendations:  none  Barriers to discharge:  None    Assessment:     Traci Blanca is a 72 y.o. female with a medical diagnosis of Acute renal failure superimposed on stage 3 chronic kidney disease. Performance deficits affecting function are weakness, impaired endurance, impaired self care skills, impaired functional mobility, impaired balance, decreased coordination, decreased upper extremity function, decreased lower extremity function, decreased safety awareness, pain, abnormal tone, decreased ROM, impaired fine motor, edema, impaired joint extensibility.   The patient demo improved bed mobility and decreased c/o right knee pain. The patient tolerated sitting on the EOB for therex and grooming tasks. The patient declined to transfer to the chair 2* c/o pain with pressure to the right knee.    Rehab Prognosis:  Good; patient would benefit from acute skilled OT services to address these deficits and reach maximum level of function.       Plan:     Patient to be seen 5 x/week to address the above listed problems via self-care/home management, therapeutic activities, therapeutic exercises  Plan of Care Expires: 10/26/23  Plan of Care Reviewed with: patient    Subjective     Chief Complaint: feels tired  Patient/Family Comments/goals: agreeable to sit on the EOB  Pain/Comfort:  Pain Rating 1:  (yes-unrated)  Location - Side 1: Right  Location 1: knee  Pain Addressed 1: Cessation of Activity, Reposition, Distraction    Objective:     Communicated with: nurse,  prior to session.  Patient found HOB elevated with PureWick, peripheral IV upon OT entry to room.    General Precautions: Standard, fall    Orthopedic Precautions:N/A  Braces: N/A  Respiratory  Status: Room air     Occupational Performance:     Bed Mobility:    Patient completed Rolling/Turning to Left with  maximal assistance  Patient completed Rolling/Turning to Right with maximal assistance  Patient completed Scooting/Bridging with maximal assistance and 2 persons  Patient completed Supine to Sit with minimum assistance and 2 persons  Patient completed Sit to Supine with moderate assistance and 2 persons     Functional Mobility/Transfers:  Patient completed Bed <> Chair Transfer ; The patient declined to transfer to the chair 2* c/o pain with pressure to the right knee.  Functional Mobility: The patient tolerated sitting on the EOB ~30 min with SBA.    Activities of Daily Living:  Grooming: supervision and set up assist to brush her teeth and wash her face. The patient combed the front of her hair.  OT combed the back.  Upper Body Dressing: moderate assistance    Lower Body Dressing: dependence        [facility] 6 Click ADL: 14    Treatment & Education:  Participated in self care and functional mobility as noted above  Performed AROM to the RUE x10. The patient was educated via demo and handout re: need to perform AROM to the RUE while in the bed   Digits I-V flexion/extension   Wrist flexion/extension  Forearm pronation/supination  Elbow flexion/extension  Shoulder flexion/extension  Shoulder elevation/depression   Forward punches  Shoulder ab/dduction   Handout placed on table  Pt encouraged to complete 1-2x/10-15 reps a day     Patient left HOB elevated with all lines intact and call button in reach    GOALS:   Multidisciplinary Problems       Occupational Therapy Goals          Problem: Occupational Therapy    Goal Priority Disciplines Outcome Interventions   Occupational Therapy Goal     OT, PT/OT Ongoing, Progressing    Description: Goals to be met by: 10/26/23     Patient will increase functional independence with ADLs by performing:    Feeding with Modified [place] and Set-up Assistance.  UE  Dressing with Contact Guard Assistance.  LE Dressing with Contact Guard Assistance.  Grooming while seated at sink with Modified [place] and Supervision.  Toileting from bedside commode with Set-up Assistance and Supervision for hygiene and clothing management.   Sitting at edge of bed x30 minutes with Modified [place].  Rolling to Bilateral with Modified [place] and Supervision.   Supine to sit with Modified [place], Set-up Assistance, and use of bedrail as needed.  Squat pivot transfers with Stand-by Assistance.  Toilet transfer to bedside commode with Stand-by Assistance.  Upper extremity exercise program x15 reps per handout, with independence.                         Time Tracking:     OT Date of Treatment: 10/13/23  OT Start Time: 1629  OT Stop Time: 1708  OT Total Time (min): 39 min    Billable Minutes:Self Care/Home Management 14  Therapeutic Exercise 10  Total Time 39 (with PT)    OT/NORMA: OT          10/13/2023

## 2023-10-13 NOTE — PROGRESS NOTES
[facility] Medicine  Progress Note    Patient Name: Traci Blanca  MRN: [id number]  Patient Class: OP- Observation   Admission Date: 10/10/2023  Length of Stay: 0 days  Attending Physician: Jane Leyva, *  Primary Care Provider: [place], Primary Doctor        Subjective:     Principal Problem:Acute renal failure superimposed on stage 3 chronic kidney disease        HPI:  Traci Blanca 72 y.o. female with history of CVA with residual left sided deficits, CKD3, history of PE no longer on anticoagulation presents to the hospital with a chief complaint of syncope.  She reports multiple syncopal episodes over the last week which she attributes to dehydration.  She reports she is been unable to eat for the last week as she was unable to care for her activities of daily since a fall and resultant right shoulder pain 2 weeks ago.  She reports she was examined at a previous hospital.  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  She denies fever chest pain shortness of breath nausea vomiting abdominal leg swelling melena hematuria hematemesis dizziness.  She reports she was unable to the tolerate Eliquis for previously diagnosed PE due to bleeding complications.    In the ED, chest x-ray with prominence of the aortic arch correlation creatinine 2.6 lactic acid negative troponin negative BNP negative.      Overview/Hospital Course:  Traci Blanca 72 y.o. female with history of CVA with residual left sided deficits, CKD3, history of PE no longer on anticoagulation presents to the hospital with a chief complaint of syncope.  She reports multiple syncopal episodes over the last week which she attributes to dehydration.   Patient has ARF on CKD and is on IVF,changed  to bicarb for acidosis,check bladder scan  Started on Rocephin for UTI.  No fracture on imaging,  Consult wound care for sacral wounds,  PT,OT.recommending SNF,consulted SW.  Patient has bigeminy and  shanda,consulted cardiology,will have NST ,will keep electrolyte WNL.I do see results in epic,cardiology is on case.  Replaced magnesium.  Has right knee swelling, doing X ray,not hot or warm to touch,consulted Ortho.        No past medical history on file.    No past surgical history on file.    Review of patient's allergies indicates:  No Known Allergies    No current facility-administered medications on file prior to encounter.     Current Outpatient Medications on File Prior to Encounter   Medication Sig    cyanocobalamin (VITAMIN B-12) 100 MCG tablet Take 100 mcg by mouth once daily.    ergocalciferol (ERGOCALCIFEROL) 50,000 unit Cap Take 1 capsule by mouth Daily.    telmisartan (MICARDIS) 80 MG Tab Take 80 mg by mouth Daily.     Family History    None       Tobacco Use    Smoking status: Not on file    Smokeless tobacco: Not on file   Substance and Sexual Activity    Alcohol use: Not on file    Drug use: Not on file    Sexual activity: Not on file     Review of Systems   Constitutional:  Negative for chills and fever.   HENT:  Negative for nosebleeds and tinnitus.    Eyes:  Negative for photophobia and visual disturbance.   Respiratory:  Negative for shortness of breath and wheezing.    Cardiovascular:  Negative for chest pain, palpitations and leg swelling.   Gastrointestinal:  Negative for abdominal distention, nausea and vomiting.   Genitourinary:  Negative for dysuria, flank pain and hematuria.   Musculoskeletal:  Positive for arthralgias. Negative for gait problem and joint swelling.   Skin:  Negative for rash and wound.   Neurological:  Negative for seizures and syncope.     Objective:     Vital Signs (Most Recent):  Temp: 97.7 °F (36.5 °C) (10/13/23 0503)  Pulse: (!) 41 (10/13/23 0503)  Resp: 18 (10/13/23 0503)  BP: 118/63 (10/13/23 0503)  SpO2: 97 % (10/13/23 0503) Vital Signs (24h Range):  Temp:  [97.5 °F (36.4 °C)-97.8 °F (36.6 °C)] 97.7 °F (36.5 °C)  Pulse:  [41-96] 41  Resp:  [18-20] 18  SpO2:   [96 %-97 %] 97 %  BP: ()/(54-64) 118/63     Weight: 72.7 kg (160 lb 5.1 oz)  Body mass index is 23 kg/m².     Physical Exam  Vitals and nursing note reviewed.   Constitutional:       General: She is not in acute distress.     Appearance: She is well-developed. She is not diaphoretic.   HENT:      Head: Normocephalic and atraumatic.      Right Ear: External ear normal.      Left Ear: External ear normal.   Eyes:      General:         Right eye: No discharge.         Left eye: No discharge.      Conjunctiva/sclera: Conjunctivae normal.   Neck:      Thyroid: No thyromegaly.   Cardiovascular:      Rate and Rhythm: Normal rate and regular rhythm.      Heart sounds: No murmur heard.  Pulmonary:      Effort: Pulmonary effort is normal. No respiratory distress.      Breath sounds: Normal breath sounds.   Abdominal:      General: Bowel sounds are normal. There is no distension.      Palpations: Abdomen is soft. There is no mass.      Tenderness: There is no abdominal tenderness.   Musculoskeletal:         General: Tenderness present. No deformity.      Cervical back: Normal range of motion and neck supple.      Right lower leg: No edema.      Left lower leg: No edema.      Comments: Right knee swelling.   Skin:     General: Skin is warm and dry.   Neurological:      Mental Status: She is alert and oriented to person, place, and time.      Sensory: No sensory deficit.      Comments: Right sided upper and lower paralysis. Baseline per patient since previous CVA   Psychiatric:         Mood and Affect: Mood normal.         Behavior: Behavior normal.                Significant Labs: CBC:   Recent Labs   Lab 10/12/23  0523 10/13/23  0553   WBC 4.38 3.73*   HGB 10.0* 9.5*   HCT 31.5* 29.6*   PLT SEE COMMENT 186       CMP:   Recent Labs   Lab 10/12/23  0523 10/13/23  0553 10/13/23  0620    140 138   K 4.5 4.7 4.0    107 106   CO2 24 26 26   * 101 101   BUN 51* 40* 41*   CREATININE 1.8* 1.8* 1.7*   CALCIUM  "8.4* 8.4* 8.2*   ANIONGAP 8 7* 6*       Cardiac Markers:   No results for input(s): "CKMB", "MYOGLOBIN", "BNP", "TROPISTAT" in the last 48 hours.    Coagulation:   No results for input(s): "PT", "INR", "APTT" in the last 48 hours.    Lactic Acid:   No results for input(s): "LACTATE" in the last 48 hours.    Troponin:   No results for input(s): "TROPONINI", "TROPONINIHS" in the last 48 hours.    Urine Studies:   No results for input(s): "COLORU", "APPEARANCEUA", "PHUR", "SPECGRAV", "PROTEINUA", "GLUCUA", "KETONESU", "BILIRUBINUA", "OCCULTUA", "NITRITE", "UROBILINOGEN", "LEUKOCYTESUR", "RBCUA", "WBCUA", "BACTERIA", "SQUAMEPITHEL", "HYALINECASTS" in the last 48 hours.    Invalid input(s): "WRIGHTSUR"      Significant Imaging:   Imaging Results               CT Chest Abdomen Pelvis Without Contrast (XPD) (Final result)  Result time 10/10/23 22:45:58      Final result by Vangie Heard MD (10/10/23 22:45:58)                   Impression:      Aneurysmal dilatation of the ascending thoracic aorta and common iliac arteries.  Moderate atherosclerotic changes.  If concern for dissection, consider additional imaging.    3 mm left apical pulmonary nodule.  For a solid nodule <6 mm, Fleischner Society 2017 guidelines recommend no routine follow up for a low risk patient, or follow-up with non-contrast chest CT at 12 months in a high risk patient.    Indeterminate low-attenuation left adrenal lesion.    Colonic diverticulosis.    Air-fluid level in the urinary bladder.  Differential considerations may include attempted instrumentation with a Fitzgerald catheter, fistulous formation, or gas-forming organism.    This report was flagged in Epic as abnormal.      Electronically signed by: Vangie Heard  Date:    10/10/2023  Time:    22:45               Narrative:    EXAMINATION:  CT CHEST ABDOMEN PELVIS WITHOUT    CLINICAL HISTORY:  Evaluation of syncope.  Hypotension onset 2 weeks ago.    TECHNIQUE:  1.25 mm unenhanced axial " images from the lung apices through the greater trochanters were performed.  Coronal and sagittal reformatted images were provided.  No IV contrast was given.    COMPARISON:  None.    FINDINGS:  In the chest, there is aneurysmal dilatation of the ascending thoracic aorta measuring up to 4.9 cm (series 2 axial image 69).  The thoracic aorta is tortuous.  There is moderate atherosclerotic changes.  A 3 mm pulmonary nodule is seen at the left lung apex (series 4 axial image 98).    Within the limits of a noncontrast examination, in the abdomen, there is no aneurysmal dilatation of the tortuous abdominal aorta.  There are moderate atherosclerotic changes.  The liver, spleen, pancreas, kidneys, and right adrenal gland are unremarkable.  The gallbladder is surgically absent.  There is a low-attenuation lobular left adrenal lesion measuring 2.0 x 1.8 cm (series 2 axial image 118). There is no gross abdominal adenopathy or ascites.  Moderate atherosclerotic disease is present.    In the pelvis, the right common iliac artery measures up to 2 cm (series 2 axial image 175).  The left common iliac artery measures up to 1.7 cm (series 2 axial image 179).  There are no pelvic masses or adenopathy.  There is no free pelvic fluid.  There is colonic diverticulosis.  There is a calcified uterine fibroid.  A small air-fluid level is seen in the urinary bladder.  Marginal and bridging osteophytes are present.                                        X-Ray Chest AP Portable (Final result)  Result time 10/10/23 18:27:04      Final result by Da Martinez MD (10/10/23 18:27:04)                   Impression:      This report was flagged in Epic as abnormal.    1. Interstitial findings may reflect underlying COPD/emphysema.  No large focal consolidation.  2. There is prominence of the aortic arch, correlation with previous exams advised, PA and lateral could be performed for further evaluation as warranted.      Electronically signed  by: Da Martinez MD  Date:    10/10/2023  Time:    18:27               Narrative:    EXAMINATION:  XR CHEST AP PORTABLE    CLINICAL HISTORY:  hypotension;    TECHNIQUE:  Single frontal view of the chest was performed.    COMPARISON:  None    FINDINGS:  The cardiomediastinal silhouette is not enlarged noting prominence of the aorta..  There is no pleural effusion.  The trachea is midline.  The lungs are symmetrically expanded bilaterally with coarse interstitial attenuation.  There are a few scattered calcified granuloma.  No large focal consolidation seen.  There is no pneumothorax.  The osseous structures are remarkable for degenerative change..                                       X-Ray Humerus 2 View Right (Final result)  Result time 10/10/23 18:05:56      Final result by Jade Rodriguez MD (10/10/23 18:05:56)                   Impression:      No acute findings.      Electronically signed by: Jade Rodriguez  Date:    10/10/2023  Time:    18:05               Narrative:    EXAMINATION:  XR HUMERUS 2 VIEW RIGHT    CLINICAL HISTORY:  Pain in arm, unspecified    TECHNIQUE:  Four views    COMPARISON:  None    FINDINGS:  Osteopenia.  No acute fracture or dislocation.  Mild glenohumeral and moderate acromioclavicular joint osteoarthritis.                                          Assessment/Plan:      * Acute renal failure superimposed on stage 3 chronic kidney disease  Presents with Cr of 2.6, baseline of 1.9 to 2.1 suspect related to decreased PO intake as unable to eat due to baseline paralysis and right sided shoulder pain now limit ADLs  Started on IVF  Renal dose medications, avoid nephrotoxic drugs, monitor intake and output  Home telmisartan held        Patient with acute kidney injury/acute renal failure likely due to pre-renal azotemia due to dehydration WALT is currently stable. Baseline creatinine 1.9 to 2.1 - Labs reviewed- Renal function/electrolytes with Estimated Creatinine Clearance: 32.3  "mL/min (A) (based on SCr of 1.7 mg/dL (H)). according to latest data. Monitor urine output and serial BMP and adjust therapy as needed. Avoid nephrotoxins and renally dose meds for GFR listed above.  Changed  IVF  to bicarb drip for worsening acidosis.    Improved.  Back to baseline.    Hypomagnesemia  Patient has Abnormal Magnesium: hypomagnesemia. Will continue to monitor electrolytes closely. Will replace the affected electrolytes and repeat labs to be done after interventions completed. The patient's magnesium results have been reviewed and are listed below.  Recent Labs   Lab 10/12/23  0523   MG 1.5*        PVC (premature ventricular contraction)    Patient has bigeminy and trigeminy,consulted cardiology,had NST ,will keep electrolyte WNL.can not find results in epic.  Replaced magnesium.    Metabolic acidosis  Started on bicarb drip.improved.      Urinary tract infection without hematuria  On rocephin.  Culture grow Ecoli.    History of CVA (cerebrovascular accident)  With residual left sided deficits. As above. PT/OT consulted    Syncope  Reports multiple syncopal episodes over the last week, suspect related dehydration as has been eating less due to inability to care for herself since fall with shoulder pain.   Troponin trend  Telemetry  Echo  Continue IVF given WALT  Will check CTA without contrast given mediastinal widening on CT  Has symmetric radial pulses     ADDENDUM: CT returned with 4.9cm aneurysm of ascending thoracic aorta similar to the 5.1cm seen on previous Echo and CT chest in care everywhere in 2022. Without chest pain, hypertension, and symetric radial pulses aortic dissection is unlikely. Per care everywhere "there is an aneurysm of the ascending aorta with a maximum axial dimension of 5.1 cm."    Shoulder pain  Has shoulder pain since fall over 2 weeks ago. X-ray without fractures  PT/OT consulted    Sacral wound,wound care doing wound management  VTE Risk Mitigation (From admission, onward) "           Ordered     heparin (porcine) injection 5,000 Units  Every 8 hours         10/11/23 1010     IP VTE HIGH RISK PATIENT  Once         10/10/23 2012     Place sequential compression device  Until discontinued         10/10/23 2012     Place CALE hose  Until discontinued         10/10/23 2012                    Discharge Planning   ANDREA:      Code Status: Full Code   Is the patient medically ready for discharge?:     Reason for patient still in hospital (select all that apply): Patient trending condition  Discharge Plan A: Skilled Nursing Facility                  Jane Leyva MD  Department of Hospital Medicine   [facility]

## 2023-10-13 NOTE — PT/OT/SLP PROGRESS
Physical Therapy Treatment    Patient Name:  Traci Blanca   MRN:  [id number]    Recommendations:     Discharge Recommendations: nursing facility, skilled  Discharge Equipment Recommendations:  (Ongoing assessment pending pt progress)  Barriers to discharge:  Pt is not functioning at Independent baseline following fall and is at increased risk for falls and readmission if she returns home at present time.    Assessment:     Traci Blanca is a 72 y.o. female admitted with a medical diagnosis of Acute renal failure superimposed on stage 3 chronic kidney disease.  She presents with the following impairments/functional limitations: weakness, impaired endurance, impaired self care skills, impaired functional mobility, impaired balance, decreased coordination, decreased upper extremity function, decreased lower extremity function, decreased safety awareness, pain, abnormal tone, decreased ROM, impaired fine motor, edema, impaired joint extensibility, impaired sensation, impaired coordination, impaired muscle length .    Rehab Prognosis: Good; patient would benefit from acute skilled PT services to address these deficits and reach maximum level of function.    Recent Surgery: * No surgery found *      Plan:     During this hospitalization, patient to be seen  (3-5x/wk) to address the identified rehab impairments via therapeutic activities, therapeutic exercises, neuromuscular re-education, wheelchair management/training and progress toward the following goals:    Plan of Care Expires:  10/26/23    Subjective     Chief Complaint: weakness and R knee pain   Patient/Family Comments/goals: pt is agreeable to therapy   Pain/Comfort:  R knee no pain at rest and pain with movements.   Pain Addressed 1: Cessation of Activity, Reposition, Distraction, Nurse notified      Objective:     Communicated with nurse  prior to session.  Patient found HOB elevated with   upon PT entry to room.     General Precautions: Standard,  fall  Orthopedic Precautions: N/A  Braces: N/A  Respiratory Status: Room air     Functional Mobility:  pt is less anxious and required less assistance with bed mobility today. Limited with transfer from bed<>wheelchair 2* to R knee pain (had R knee aspiration via ortho this AM ) , nurse notified .   Bed Mobility:     Rolling Left:  maximal assistance  Rolling Right: maximal assistance  Scooting: maximal assistance and of 2 persons  Supine to Sit: minimum assistance and of 2 persons  Sit to Supine: moderate assistance and of 2 persons  Balance: fair in sitting balance.       AM-PAC 6 CLICK MOBILITY  Turning over in bed (including adjusting bedclothes, sheets and blankets)?: 2  Sitting down on and standing up from a chair with arms (e.g., wheelchair, bedside commode, etc.): 1  Moving from lying on back to sitting on the side of the bed?: 3  Moving to and from a bed to a chair (including a wheelchair)?: 2  Need to walk in hospital room?: 1  Climbing 3-5 steps with a railing?: 1  Basic Mobility Total Score: 10       Treatment & Education:  Lower Extremity Exercises.    Patient educated on: Purpose and Benefits of Therapeutic Exercise(s).    Patient verbalized acceptance/understanding of instruction(s), expectation(s), and limitation(s) (for safety).  Patient performed: 2 sets of 10 reps (each) of B LE Ther Ex( AAROM - PROM LLE) : AP ( R) , R LAQ, Hip abd/add, Hip flexion, GS  while sitting up on EOB.       Patient required verbal cues/tactile cues to ensure correct sequence, to maintain proper form, and to allow for self-correction.    Patient left with bed in chair position with pillow to offload L side side, pressure relied boots to BLE , BUE elevated on pillow all lines intact, call button in reach, bed alarm on, nurse notified, and OT  present..    GOALS:   Multidisciplinary Problems       Physical Therapy Goals          Problem: Physical Therapy    Goal Priority Disciplines Outcome Goal Variances Interventions    Physical Therapy Goal     PT, PT/OT Ongoing, Progressing     Description: Goals to be met by: 10/26/23     Patient will increase functional independence with mobility by performin. Supine to sit with Modified [place]  2. Rolling to Left and Right with Modified [place].  3. Bed to chair transfer with Modified [place]   4. Wheelchair propulsion x50-100 feet with Modified [place]   5. Sitting at edge of bed x15 minutes with Modified [place]  6. Lower extremity exercise program x10 reps per handout, with independence                         Time Tracking:     PT Received On: 10/13/23  PT Start Time:      PT Stop Time: 170  PT Total Time (min): 35 min     Billable Minutes: Therapeutic Exercise 15 and Total Time 35 min with OT              Number of PTA visits since last PT visit: 0     10/13/2023

## 2023-10-13 NOTE — ASSESSMENT & PLAN NOTE
Patient has bigeminy and trigeminy,consulted cardiology,had NST ,will keep electrolyte WNL.can not find results in epic.  Replaced magnesium.

## 2023-10-13 NOTE — HOSPITAL COURSE
Interval Hx: no cp/sob.  Pt refused stress test yesterday (uncomfortable laying flat).  Now eating breakfast.    Tele: SR 70s, PVCs (personally reviewed and interpreted)

## 2023-10-13 NOTE — SUBJECTIVE & OBJECTIVE
No past medical history on file.    No past surgical history on file.    Review of patient's allergies indicates:  No Known Allergies    No current facility-administered medications on file prior to encounter.     Current Outpatient Medications on File Prior to Encounter   Medication Sig    cyanocobalamin (VITAMIN B-12) 100 MCG tablet Take 100 mcg by mouth once daily.    ergocalciferol (ERGOCALCIFEROL) 50,000 unit Cap Take 1 capsule by mouth Daily.    telmisartan (MICARDIS) 80 MG Tab Take 80 mg by mouth Daily.     Family History    None       Tobacco Use    Smoking status: Not on file    Smokeless tobacco: Not on file   Substance and Sexual Activity    Alcohol use: Not on file    Drug use: Not on file    Sexual activity: Not on file     Review of Systems   Constitutional:  Negative for chills and fever.   HENT:  Negative for nosebleeds and tinnitus.    Eyes:  Negative for photophobia and visual disturbance.   Respiratory:  Negative for shortness of breath and wheezing.    Cardiovascular:  Negative for chest pain, palpitations and leg swelling.   Gastrointestinal:  Negative for abdominal distention, nausea and vomiting.   Genitourinary:  Negative for dysuria, flank pain and hematuria.   Musculoskeletal:  Positive for arthralgias. Negative for gait problem and joint swelling.   Skin:  Negative for rash and wound.   Neurological:  Negative for seizures and syncope.     Objective:     Vital Signs (Most Recent):  Temp: 97.7 °F (36.5 °C) (10/13/23 0503)  Pulse: (!) 41 (10/13/23 0503)  Resp: 18 (10/13/23 0503)  BP: 118/63 (10/13/23 0503)  SpO2: 97 % (10/13/23 0503) Vital Signs (24h Range):  Temp:  [97.5 °F (36.4 °C)-97.8 °F (36.6 °C)] 97.7 °F (36.5 °C)  Pulse:  [41-96] 41  Resp:  [18-20] 18  SpO2:  [96 %-97 %] 97 %  BP: ()/(54-64) 118/63     Weight: 72.7 kg (160 lb 5.1 oz)  Body mass index is 23 kg/m².     Physical Exam  Vitals and nursing note reviewed.   Constitutional:       General: She is not in acute  "distress.     Appearance: She is well-developed. She is not diaphoretic.   HENT:      Head: Normocephalic and atraumatic.      Right Ear: External ear normal.      Left Ear: External ear normal.   Eyes:      General:         Right eye: No discharge.         Left eye: No discharge.      Conjunctiva/sclera: Conjunctivae normal.   Neck:      Thyroid: No thyromegaly.   Cardiovascular:      Rate and Rhythm: Normal rate and regular rhythm.      Heart sounds: No murmur heard.  Pulmonary:      Effort: Pulmonary effort is normal. No respiratory distress.      Breath sounds: Normal breath sounds.   Abdominal:      General: Bowel sounds are normal. There is no distension.      Palpations: Abdomen is soft. There is no mass.      Tenderness: There is no abdominal tenderness.   Musculoskeletal:         General: Tenderness present. No deformity.      Cervical back: Normal range of motion and neck supple.      Right lower leg: No edema.      Left lower leg: No edema.      Comments: Right knee swelling.   Skin:     General: Skin is warm and dry.   Neurological:      Mental Status: She is alert and oriented to person, place, and time.      Sensory: No sensory deficit.      Comments: Right sided upper and lower paralysis. Baseline per patient since previous CVA   Psychiatric:         Mood and Affect: Mood normal.         Behavior: Behavior normal.                Significant Labs: CBC:   Recent Labs   Lab 10/12/23  0523 10/13/23  0553   WBC 4.38 3.73*   HGB 10.0* 9.5*   HCT 31.5* 29.6*   PLT SEE COMMENT 186       CMP:   Recent Labs   Lab 10/12/23  0523 10/13/23  0553 10/13/23  0620    140 138   K 4.5 4.7 4.0    107 106   CO2 24 26 26   * 101 101   BUN 51* 40* 41*   CREATININE 1.8* 1.8* 1.7*   CALCIUM 8.4* 8.4* 8.2*   ANIONGAP 8 7* 6*       Cardiac Markers:   No results for input(s): "CKMB", "MYOGLOBIN", "BNP", "TROPISTAT" in the last 48 hours.    Coagulation:   No results for input(s): "PT", "INR", "APTT" in the last " "48 hours.    Lactic Acid:   No results for input(s): "LACTATE" in the last 48 hours.    Troponin:   No results for input(s): "TROPONINI", "TROPONINIHS" in the last 48 hours.    Urine Studies:   No results for input(s): "COLORU", "APPEARANCEUA", "PHUR", "SPECGRAV", "PROTEINUA", "GLUCUA", "KETONESU", "BILIRUBINUA", "OCCULTUA", "NITRITE", "UROBILINOGEN", "LEUKOCYTESUR", "RBCUA", "WBCUA", "BACTERIA", "SQUAMEPITHEL", "HYALINECASTS" in the last 48 hours.    Invalid input(s): "WRIGHTSUR"      Significant Imaging:   Imaging Results               CT Chest Abdomen Pelvis Without Contrast (XPD) (Final result)  Result time 10/10/23 22:45:58      Final result by Vangie Heard MD (10/10/23 22:45:58)                   Impression:      Aneurysmal dilatation of the ascending thoracic aorta and common iliac arteries.  Moderate atherosclerotic changes.  If concern for dissection, consider additional imaging.    3 mm left apical pulmonary nodule.  For a solid nodule <6 mm, Fleischner Society 2017 guidelines recommend no routine follow up for a low risk patient, or follow-up with non-contrast chest CT at 12 months in a high risk patient.    Indeterminate low-attenuation left adrenal lesion.    Colonic diverticulosis.    Air-fluid level in the urinary bladder.  Differential considerations may include attempted instrumentation with a Fitzgerald catheter, fistulous formation, or gas-forming organism.    This report was flagged in Epic as abnormal.      Electronically signed by: Vangie Heard  Date:    10/10/2023  Time:    22:45               Narrative:    EXAMINATION:  CT CHEST ABDOMEN PELVIS WITHOUT    CLINICAL HISTORY:  Evaluation of syncope.  Hypotension onset 2 weeks ago.    TECHNIQUE:  1.25 mm unenhanced axial images from the lung apices through the greater trochanters were performed.  Coronal and sagittal reformatted images were provided.  No IV contrast was given.    COMPARISON:  None.    FINDINGS:  In the chest, there is " aneurysmal dilatation of the ascending thoracic aorta measuring up to 4.9 cm (series 2 axial image 69).  The thoracic aorta is tortuous.  There is moderate atherosclerotic changes.  A 3 mm pulmonary nodule is seen at the left lung apex (series 4 axial image 98).    Within the limits of a noncontrast examination, in the abdomen, there is no aneurysmal dilatation of the tortuous abdominal aorta.  There are moderate atherosclerotic changes.  The liver, spleen, pancreas, kidneys, and right adrenal gland are unremarkable.  The gallbladder is surgically absent.  There is a low-attenuation lobular left adrenal lesion measuring 2.0 x 1.8 cm (series 2 axial image 118). There is no gross abdominal adenopathy or ascites.  Moderate atherosclerotic disease is present.    In the pelvis, the right common iliac artery measures up to 2 cm (series 2 axial image 175).  The left common iliac artery measures up to 1.7 cm (series 2 axial image 179).  There are no pelvic masses or adenopathy.  There is no free pelvic fluid.  There is colonic diverticulosis.  There is a calcified uterine fibroid.  A small air-fluid level is seen in the urinary bladder.  Marginal and bridging osteophytes are present.                                        X-Ray Chest AP Portable (Final result)  Result time 10/10/23 18:27:04      Final result by Da Martinez MD (10/10/23 18:27:04)                   Impression:      This report was flagged in Epic as abnormal.    1. Interstitial findings may reflect underlying COPD/emphysema.  No large focal consolidation.  2. There is prominence of the aortic arch, correlation with previous exams advised, PA and lateral could be performed for further evaluation as warranted.      Electronically signed by: Da Martinez MD  Date:    10/10/2023  Time:    18:27               Narrative:    EXAMINATION:  XR CHEST AP PORTABLE    CLINICAL HISTORY:  hypotension;    TECHNIQUE:  Single frontal view of the chest was  performed.    COMPARISON:  None    FINDINGS:  The cardiomediastinal silhouette is not enlarged noting prominence of the aorta..  There is no pleural effusion.  The trachea is midline.  The lungs are symmetrically expanded bilaterally with coarse interstitial attenuation.  There are a few scattered calcified granuloma.  No large focal consolidation seen.  There is no pneumothorax.  The osseous structures are remarkable for degenerative change..                                       X-Ray Humerus 2 View Right (Final result)  Result time 10/10/23 18:05:56      Final result by Jade Rodriguez MD (10/10/23 18:05:56)                   Impression:      No acute findings.      Electronically signed by: Jade Rodriguez  Date:    10/10/2023  Time:    18:05               Narrative:    EXAMINATION:  XR HUMERUS 2 VIEW RIGHT    CLINICAL HISTORY:  Pain in arm, unspecified    TECHNIQUE:  Four views    COMPARISON:  None    FINDINGS:  Osteopenia.  No acute fracture or dislocation.  Mild glenohumeral and moderate acromioclavicular joint osteoarthritis.

## 2023-10-13 NOTE — PT/OT/SLP PROGRESS
"Speech Language Pathology Treatment    Patient Name:  Traci Blanca   MRN:  [id number]  Admitting Diagnosis: Acute renal failure superimposed on stage 3 chronic kidney disease    Recommendations:                 General Recommendations:  Follow-up not indicated  Diet recommendations:  Regular Diet - IDDSI Level 7, Liquid Diet Level: Thin liquids - IDDSI Level 0   Aspiration Precautions: Meds whole 1 at a time and Standard aspiration precautions   General Precautions: Standard,    Communication strategies:  none    Assessment:     Traci Blanca is a 72 y.o. female with a dx of Acute renal failure superimposed on stage 3 chronic kidney disease, who presents with cont functional swallowing abilities, and is safe to cont current regular diet with thin liquids. Meds whole. No further ST is required at this time, as the pt is consuming the safest and least restrictive diet.     Subjective     Pt was alert and awake upon ST's entrance. Lunch tray arrived ~5 min following ST's entrance, with pt agreeable to minimal intake. ST and pt's nurse repositioned pt to optimal chair-like position in bed to better A with eating/drinking, however, pt with cont complaints of knee pain and unable to maintain 90 degrees during intake.     Patient goals: "Better food"     Pain/Comfort:  Pain Rating 1: 0/10    Respiratory Status: Room air    Objective:     Has the patient been evaluated by SLP for swallowing?   Yes  Keep patient NPO? No   Current Respiratory Status:        The pt indep consumed the following po intake from her lunch tray: x2 bites of fish, as well as x3 cup-edge sips of cranberry juice. Pt with adequate and timely oral prep with soft solids of fish. No overt s/s of aspiration. Pt deferred remainder of items on tray 2/2 fish being "dry" and pt with difficulty clearing dry boluses. Pt requested tuna fish in a bowl, with ST calling dietary for new tray to be sent.     No further ST is required at this time, as the pt is " consuming the safest and least restrictive diet.       Goals:   Multidisciplinary Problems       SLP Goals       Not on file              Multidisciplinary Problems (Resolved)          Problem: SLP    Goal Priority Disciplines Outcome   SLP Goal   (Resolved)    Low SLP Met   Description: Goals:  1. Pt will tolerate a regular diet with thin liquids w/o any overt s/s of aspiration across x2 consecutive sessions (2 of 2 met 10/13).                          Plan:     Patient to be seen:  2 x/week   Plan of Care expires:     Plan of Care reviewed with:  patient   SLP Follow-Up:  No       Discharge recommendations:      Barriers to Discharge:  None    Time Tracking:     SLP Treatment Date:   10/13/23  Speech Start Time:  1201  Speech Stop Time:  1220     Speech Total Time (min):  19 min    Billable Minutes: Treatment Swallowing Dysfunction 19    10/13/2023

## 2023-10-13 NOTE — PLAN OF CARE
Problem: SLP  Goal: SLP Goal  Description: Goals:  1. Pt will tolerate a regular diet with thin liquids w/o any overt s/s of aspiration across x2 consecutive sessions (2 of 2 met 10/13).     Outcome: Met

## 2023-10-13 NOTE — ASSESSMENT & PLAN NOTE
Frequent PVCs in a patient with multiple risk factors for coronary artery disease and recent syncope.  Differential diagnosis of syncope is multifactorial including dehydration.  But because of frequent PVCs, ventricular arrhythmia can not be ruled out.  Check a stress test to rule out large areas of ischemia.  Keep potassium around 4 and magnesium around 2.  Event monitor as an outpatient.  Continue tele monitoring as an inpatient    10/12: pt refused stress test.    10/13: now eating breakfast.  No arrhythmia on tele.  EF normal on echo.  Consider outpat stress test and EVM.

## 2023-10-13 NOTE — NURSING
Nurses Note -- 4 Eyes      10/13/2023   5:45 PM      Skin assessed during: Daily Assessment      [x] No Altered Skin Integrity Present    [x]Prevention Measures Documented      [] Yes- Altered Skin Integrity Present or Discovered   [] LDA Added if Not in Epic (Describe Wound)   [] New Altered Skin Integrity was Present on Admit and Documented in LDA   [] Wound Image Taken    Wound Care Consulted? Yes    Attending Nurse:  HALLE Carpenter    Second RN/Staff Member:   OMARI Lopez       Patient has no open wounds or new wounds. Patient has a healing sacral wound/ scars on nery buttocks with tan discoloration and white pigmentation per wound care nurse

## 2023-10-13 NOTE — ASSESSMENT & PLAN NOTE
Presents with Cr of 2.6, baseline of 1.9 to 2.1 suspect related to decreased PO intake as unable to eat due to baseline paralysis and right sided shoulder pain now limit ADLs  Started on IVF  Renal dose medications, avoid nephrotoxic drugs, monitor intake and output  Home telmisartan held        Patient with acute kidney injury/acute renal failure likely due to pre-renal azotemia due to dehydration WALT is currently stable. Baseline creatinine 1.9 to 2.1 - Labs reviewed- Renal function/electrolytes with Estimated Creatinine Clearance: 32.3 mL/min (A) (based on SCr of 1.7 mg/dL (H)). according to latest data. Monitor urine output and serial BMP and adjust therapy as needed. Avoid nephrotoxins and renally dose meds for GFR listed above.  Changed  IVF  to bicarb drip for worsening acidosis.    Improved.  Back to baseline.

## 2023-10-13 NOTE — PLAN OF CARE
Call placed to pt to advise [facility] has accepted her.  Pt in agreement with this facility.  TN advised that she will need someone to complete admission paperwork for her.  She reports that she can complete her own paperwork if they will allow her to if not her POA Lilia will be able to.      Call placed to Truong at [facility] to advise pt in agreement with going to facility and d/c potentially today.  TN provided [facility] with POA information to contact to complete paperwork.  TN requested attending MD name to provide to N to submit for auth.     Call placed pt Tita with [facility] provided accepting facility name as well as attending MD at facility.  TN advise potential d/c on today if auth provided

## 2023-10-13 NOTE — SUBJECTIVE & OBJECTIVE
Review of Systems   Gastrointestinal:  Negative for melena.   Genitourinary:  Negative for hematuria.     Objective:     Vital Signs (Most Recent):  Temp: 97.9 °F (36.6 °C) (10/13/23 0806)  Pulse: (!) 45 (10/13/23 0806)  Resp: 18 (10/13/23 0806)  BP: 137/66 (10/13/23 0806)  SpO2: 96 % (10/13/23 0806) Vital Signs (24h Range):  Temp:  [97.5 °F (36.4 °C)-97.9 °F (36.6 °C)] 97.9 °F (36.6 °C)  Pulse:  [41-96] 45  Resp:  [18-20] 18  SpO2:  [96 %-97 %] 96 %  BP: ()/(54-66) 137/66     Weight: 72.7 kg (160 lb 5.1 oz)  Body mass index is 23 kg/m².     SpO2: 96 %         Intake/Output Summary (Last 24 hours) at 10/13/2023 0809  Last data filed at 10/13/2023 0654  Gross per 24 hour   Intake 2586.44 ml   Output 500 ml   Net 2086.44 ml       Lines/Drains/Airways       Peripheral Intravenous Line  Duration                  Peripheral IV - Single Lumen 10/11/23 0941 22 G Posterior;Right Hand 1 day                       Physical Exam  Constitutional:       General: She is not in acute distress.     Appearance: Normal appearance. She is well-developed and normal weight. She is not ill-appearing, toxic-appearing or diaphoretic.   HENT:      Head: Normocephalic and atraumatic.   Eyes:      General: No scleral icterus.     Extraocular Movements: Extraocular movements intact.      Conjunctiva/sclera: Conjunctivae normal.      Pupils: Pupils are equal, round, and reactive to light.   Neck:      Vascular: No JVD.      Trachea: No tracheal deviation.   Cardiovascular:      Rate and Rhythm: Normal rate and regular rhythm. Frequent Extrasystoles are present.     Heart sounds: S1 normal and S2 normal. No murmur heard.     No friction rub. No gallop.   Pulmonary:      Effort: Pulmonary effort is normal. No respiratory distress.      Breath sounds: Normal breath sounds. No stridor. No wheezing, rhonchi or rales.   Chest:      Chest wall: No tenderness.   Abdominal:      General: There is no distension.      Palpations: Abdomen is soft.    Musculoskeletal:         General: No swelling or tenderness. Normal range of motion.      Cervical back: Normal range of motion and neck supple. No rigidity.      Right lower leg: No edema.      Left lower leg: No edema.   Skin:     General: Skin is warm and dry.      Coloration: Skin is not jaundiced.   Neurological:      General: No focal deficit present.      Mental Status: She is alert and oriented to person, place, and time.      Cranial Nerves: No cranial nerve deficit.   Psychiatric:         Mood and Affect: Mood normal.         Behavior: Behavior normal.            Current Medications:   cefTRIAXone (ROCEPHIN) IVPB  1 g Intravenous Q24H    heparin (porcine)  5,000 Units Subcutaneous Q8H    regadenoson  0.4 mg Intravenous Once    tuberculin  5 Units Intradermal Once      sodium chloride 0.9% 75 mL/hr at 10/13/23 0809     acetaminophen, acetaminophen, glucagon (human recombinant), glucose, glucose, magnesium oxide, magnesium oxide, melatonin, naloxone, senna-docusate 8.6-50 mg, sodium chloride 0.9%    Laboratory (all labs reviewed):  CBC:  Recent Labs   Lab 09/07/22  1216 10/10/23  1714 10/11/23  0548 10/12/23  0523 10/13/23  0553   WBC TNTC A 4.85 4.79 4.38 3.73 L   Hemoglobin  --  11.0 L 12.1 10.0 L 9.5 L   Hematocrit  --  34.4 L 38.4 31.5 L 29.6 L   Platelets  --  269 245 SEE COMMENT 186       CHEMISTRIES:  Recent Labs   Lab 10/10/23  1714 10/11/23  0548 10/12/23  0523 10/13/23  0553 10/13/23  0620   Glucose 100 88 116 H 101 101   Sodium 132 L 134 L 136 140 138   Potassium 4.4 4.2 4.5 4.7 4.0   BUN 78 H 67 H 51 H 40 H 41 H   Creatinine 2.6 H 2.3 H 1.8 H 1.8 H 1.7 H   eGFR 19 A 22 A 30 A 30 A 32 A   Calcium 9.4 9.0 8.4 L 8.4 L 8.2 L   Magnesium 1.8  --  1.5 L 1.8  --        CARDIAC BIOMARKERS:  Recent Labs   Lab 10/10/23  1714   Troponin I 0.011       COAGS:  Recent Labs   Lab 10/10/23  1714   INR 1.0       LIPIDS/LFTS:  Recent Labs   Lab 09/08/22  0525 09/09/22  0612 09/10/22  0359 10/10/23  1714  10/11/23  0548   AST 17 15 13 11 13   ALT 8 L <7 L 8 L 7 L 8 L       BNP:  Recent Labs   Lab 10/10/23  1714   BNP 86       TSH:        Free T4:        Diagnostic Results:  Echo: 10/11/23    Left Ventricle: Normal wall motion. There is normal systolic function with a visually estimated ejection fraction of 65 - 70%. Grade I diastolic dysfunction.    Left Atrium: Left atrium is mildly dilated.    Right Ventricle: Normal right ventricular cavity size. Systolic function is normal.    Aortic Valve: There is mild aortic regurgitation.    Aorta: Ascending aorta is moderately dilated measuring 4.64 cm.    Pulmonary Artery: The estimated pulmonary artery systolic pressure is 17 mmHg.    IVC/SVC: Normal venous pressure at 3 mmHg.    Stress Test: pt refused

## 2023-10-13 NOTE — PLAN OF CARE
Problem: Adult Inpatient Plan of Care  Goal: Plan of Care Review  Outcome: Ongoing, Progressing  Goal: Patient-Specific Goal (Individualized)  Outcome: Ongoing, Progressing  Goal: Optimal Comfort and Wellbeing  Outcome: Ongoing, Progressing  Goal: Readiness for Transition of Care  Outcome: Ongoing, Progressing     Problem: Skin Injury Risk Increased  Goal: Skin Health and Integrity  Outcome: Ongoing, Progressing     Problem: Fluid and Electrolyte Imbalance (Acute Kidney Injury/Impairment)  Goal: Fluid and Electrolyte Balance  Outcome: Ongoing, Progressing     Problem: Oral Intake Inadequate (Acute Kidney Injury/Impairment)  Goal: Optimal Nutrition Intake  Outcome: Ongoing, Progressing     Problem: Renal Function Impairment (Acute Kidney Injury/Impairment)  Goal: Effective Renal Function  Outcome: Ongoing, Progressing     Problem: Impaired Wound Healing  Goal: Optimal Wound Healing  Outcome: Ongoing, Progressing     Problem: Fall Injury Risk  Goal: Absence of Fall and Fall-Related Injury  Outcome: Ongoing, Progressing

## 2023-10-14 LAB
ANION GAP SERPL CALC-SCNC: 10 MMOL/L (ref 8–16)
BACTERIA BLD CULT: NORMAL
BACTERIA BLD CULT: NORMAL
BUN SERPL-MCNC: 32 MG/DL (ref 8–23)
CALCIUM SERPL-MCNC: 8.3 MG/DL (ref 8.7–10.5)
CHLORIDE SERPL-SCNC: 107 MMOL/L (ref 95–110)
CO2 SERPL-SCNC: 23 MMOL/L (ref 23–29)
CREAT SERPL-MCNC: 1.6 MG/DL (ref 0.5–1.4)
EST. GFR  (NO RACE VARIABLE): 34 ML/MIN/1.73 M^2
GLUCOSE SERPL-MCNC: 95 MG/DL (ref 70–110)
POTASSIUM SERPL-SCNC: 3.9 MMOL/L (ref 3.5–5.1)
SODIUM SERPL-SCNC: 140 MMOL/L (ref 136–145)

## 2023-10-14 PROCEDURE — 25000003 PHARM REV CODE 250: Performed by: HOSPITALIST

## 2023-10-14 PROCEDURE — G0378 HOSPITAL OBSERVATION PER HR: HCPCS

## 2023-10-14 PROCEDURE — 96365 THER/PROPH/DIAG IV INF INIT: CPT | Mod: 59

## 2023-10-14 PROCEDURE — 25000003 PHARM REV CODE 250: Performed by: FAMILY MEDICINE

## 2023-10-14 PROCEDURE — 80048 BASIC METABOLIC PNL TOTAL CA: CPT | Performed by: HOSPITALIST

## 2023-10-14 PROCEDURE — 36415 COLL VENOUS BLD VENIPUNCTURE: CPT | Performed by: HOSPITALIST

## 2023-10-14 PROCEDURE — 96361 HYDRATE IV INFUSION ADD-ON: CPT

## 2023-10-14 PROCEDURE — 63600175 PHARM REV CODE 636 W HCPCS: Performed by: HOSPITALIST

## 2023-10-14 RX ORDER — TIZANIDINE 2 MG/1
2 TABLET ORAL ONCE
Status: COMPLETED | OUTPATIENT
Start: 2023-10-14 | End: 2023-10-14

## 2023-10-14 RX ORDER — HYDROCODONE BITARTRATE AND ACETAMINOPHEN 7.5; 325 MG/1; MG/1
1 TABLET ORAL EVERY 6 HOURS PRN
Status: DISCONTINUED | OUTPATIENT
Start: 2023-10-14 | End: 2023-10-17 | Stop reason: HOSPADM

## 2023-10-14 RX ADMIN — CEFTRIAXONE 1 G: 1 INJECTION, POWDER, FOR SOLUTION INTRAMUSCULAR; INTRAVENOUS at 10:10

## 2023-10-14 RX ADMIN — TIZANIDINE 2 MG: 2 TABLET ORAL at 01:10

## 2023-10-14 RX ADMIN — HYDROCODONE BITARTRATE AND ACETAMINOPHEN 1 TABLET: 7.5; 325 TABLET ORAL at 10:10

## 2023-10-14 RX ADMIN — SODIUM CHLORIDE: 9 INJECTION, SOLUTION INTRAVENOUS at 01:10

## 2023-10-14 NOTE — ASSESSMENT & PLAN NOTE
Has shoulder pain since fall over 2 weeks ago. X-ray without fractures  PT/OT consulted  Ortho following.

## 2023-10-14 NOTE — NURSING
Pt called complaining of spasms to left side. tylenol 650mg given @2350. Notified NP, new order for zanaflex 2mg. Medication given. Cont IVF infusing. Pt refused repositioning. Safety measures maintained. Will cont to monitor

## 2023-10-14 NOTE — ASSESSMENT & PLAN NOTE
Initially concern based on chest xray with CT imaging showing aneurysmal dilatation of ascending thoracic aorta . Based on documentation it appears this was seen previously as well.   Cardiology consult - 4.6 cm on echo and recommend repeat echo in 6 months

## 2023-10-14 NOTE — PROGRESS NOTES
[facility] Medicine  Progress Note    Patient Name: Traci Blanca  MRN: [id number]  Patient Class: OP- Observation   Admission Date: 10/10/2023  Length of Stay: 0 days  Attending Physician: Kash Rider MD  Primary Care Provider: Tsering, Primary Doctor        Subjective:     Principal Problem:Acute renal failure superimposed on stage 3 chronic kidney disease        HPI:  Traci Blanca 72 y.o. female with history of CVA with residual left sided deficits, CKD3, history of PE no longer on anticoagulation presents to the hospital with a chief complaint of syncope.  She reports multiple syncopal episodes over the last week which she attributes to dehydration.  She reports she is been unable to eat for the last week as she was unable to care for her activities of daily since a fall and resultant right shoulder pain 2 weeks ago.  She reports she was examined at a previous hospital.  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  She denies fever chest pain shortness of breath nausea vomiting abdominal leg swelling melena hematuria hematemesis dizziness.  She reports she was unable to the tolerate Eliquis for previously diagnosed PE due to bleeding complications.    In the ED, chest x-ray with prominence of the aortic arch correlation creatinine 2.6 lactic acid negative troponin negative BNP negative.      Overview/Hospital Course:  Traci Blanca 72 y.o. female with history of CVA with residual left sided deficits, CKD3, history of PE no longer on anticoagulation presents to the hospital with a chief complaint of syncope.  She reports multiple syncopal episodes over the last week which she attributes to dehydration.   Patient has ARF on CKD and is on IVF,changed  to bicarb for acidosis - metabolic acidosis has resolved  Urine culture shows E.coli UTI - continue IV rocephin during hospitalization  Rt knee with significant swelling, not warm to touch - xray imaging shows significant  effusion  Orthopedic surgery consulted - s/p rt knee aspiration - cultures pending but low likelihood of infection at this point  Cardiology was consulted for bigeminy/trigeminy - appreciate assistance  PT/OT - recommend SNF and case management assisting in this  Will continue to monitor electrolytes and treat as needed    10/14 - met acidosis has resolved, urine culture and sensitivities reviewed showing e.coli. Rt knee cultures pending but low likelihood of infection. She is tearful and upset this am as it is anniversary of her  death. Additionally reports increased pain in rt knee, will start pain medication for severe pain. Await aspiration cultures and anticipate d/c to SNF      No new subjective & objective note has been filed under this hospital service since the last note was generated.  Physical exam  Gen: Alert, oriented x3, sad but in no acute distress  Head: normocephalic, atraumatic  Neck: nontender  Cardio: rrr, no murmur noted   Resp: normal pulm effort, cta b/l, no resp distress  Abdominal: soft/nontender/nondistended  MSK; rt knee with significant swelling noted - nontender, non erythematous, not warm to touch but with reduced rom especially with flexion/extension  Neuro: alert/orient x3, hemiparesis which is baseline  Psych: sad and tearful today     Assessment/Plan:      * Acute renal failure superimposed on stage 3 chronic kidney disease  Presents with Cr of 2.6, baseline of 1.9 to 2.1 suspect related to decreased PO intake as unable to eat due to baseline paralysis and right sided shoulder pain now limit ADLs  Started on IVF  Renal dose medications, avoid nephrotoxic drugs, monitor intake and output  Home telmisartan held        Patient with acute kidney injury/acute renal failure likely due to pre-renal azotemia due to dehydration WALT is currently stable. Baseline creatinine 1.9 to 2.1 - Labs reviewed- Renal function/electrolytes with Estimated Creatinine Clearance: 34.4 mL/min (A)  "(based on SCr of 1.6 mg/dL (H)). according to latest data. Monitor urine output and serial BMP and adjust therapy as needed. Avoid nephrotoxins and renally dose meds for GFR listed above.  Changed  IVF  to bicarb drip for worsening acidosis.    Improved s/p fluids and now back to baseline. Continue gentle iv hydration     Aortic root dilatation  Initially concern based on chest xray with CT imaging showing aneurysmal dilatation of ascending thoracic aorta . Based on documentation it appears this was seen previously as well.   Cardiology consult - 4.6 cm on echo and recommend repeat echo in 6 months      Hypomagnesemia  Patient has Abnormal Magnesium: hypomagnesemia. Will continue to monitor electrolytes closely. Will replace the affected electrolytes and repeat labs to be done after interventions completed. The patient's magnesium results have been reviewed and are listed below.  No results for input(s): "MG" in the last 24 hours.     PVC (premature ventricular contraction)    Patient has bigeminy and trigeminy,consulted cardiology,had NST ,will keep electrolyte WNL.can not find results in epic.  Replaced magnesium.    Metabolic acidosis  Started on bicarb drip  Resolved now       Urinary tract infection without hematuria  Abnormal UA with urine culture growing E.Coli. Sensitivities reviewed  Continue rocephin during hospitalization       History of CVA (cerebrovascular accident)  With residual left sided deficits. As above. PT/OT consulted    Syncope  Reports multiple syncopal episodes over the last week, suspect related dehydration as has been eating less due to inability to care for herself since fall with shoulder pain.   Troponin trend  Telemetry  Echo  Continue IVF given WALT  Will check CTA without contrast given mediastinal widening on CT  Has symmetric radial pulses     ADDENDUM: CT returned with 4.9cm aneurysm of ascending thoracic aorta similar to the 5.1cm seen on previous Echo and CT chest in care " "everywhere in 2022. Without chest pain, hypertension, and symetric radial pulses aortic dissection is unlikely. Per care everywhere "there is an aneurysm of the ascending aorta with a maximum axial dimension of 5.1 cm."    Shoulder pain  Has shoulder pain since fall over 2 weeks ago. X-ray without fractures  PT/OT consulted    Sacral wound,wound care doing wound management  VTE Risk Mitigation (From admission, onward)           Ordered     heparin (porcine) injection 5,000 Units  Every 8 hours         10/11/23 1010     IP VTE HIGH RISK PATIENT  Once         10/10/23 2012     Place sequential compression device  Until discontinued         10/10/23 2012     Place CALE hose  Until discontinued         10/10/23 2012                    Discharge Planning   ANDREA: 10/13/2023     Code Status: Full Code   Is the patient medically ready for discharge?:     Reason for patient still in hospital (select all that apply): Patient trending condition  Discharge Plan A: Skilled Nursing Facility                  Kash Rider MD  Department of Hospital Medicine   [facility] [place] [facility] Surg    "

## 2023-10-14 NOTE — NURSING
Nurses Note -- 4 Eyes      10/14/2023   4:22 PM      Skin assessed during: Daily Assessment      [] No Altered Skin Integrity Present    []Prevention Measures Documented      [x] Yes- Altered Skin Integrity Present or Discovered   [] LDA Added if Not in Epic (Describe Wound)   [] New Altered Skin Integrity was Present on Admit and Documented in LDA   [] Wound Image Taken    Wound Care Consulted? Yes    Attending Nurse:  HALLE Carpenter      Second RN/Staff Member:   OMARI Prasad       Patient has no open wounds or new wounds. Patient has a healing sacral wound/ scars on nery buttocks with tan discoloration and white pigmentation per wound care nurse

## 2023-10-14 NOTE — ASSESSMENT & PLAN NOTE
Reports multiple syncopal episodes over the last week, suspect related dehydration as has been eating less due to inability to care for herself since fall with shoulder pain.   No syncopal symptoms since admits.

## 2023-10-14 NOTE — ASSESSMENT & PLAN NOTE
Presents with Cr of 2.6, baseline of 1.9 to 2.1 suspect related to decreased PO intake as unable to eat due to baseline paralysis and right sided shoulder pain now limit ADLs  Started on IVF  Renal dose medications, avoid nephrotoxic drugs, monitor intake and output  Home telmisartan held  Creat now back to baseline.  Awaiting SNF placement.

## 2023-10-15 LAB
ANION GAP SERPL CALC-SCNC: 9 MMOL/L (ref 8–16)
BASOPHILS # BLD AUTO: 0.03 K/UL (ref 0–0.2)
BASOPHILS NFR BLD: 0.5 % (ref 0–1.9)
BUN SERPL-MCNC: 28 MG/DL (ref 8–23)
CALCIUM SERPL-MCNC: 8.2 MG/DL (ref 8.7–10.5)
CHLORIDE SERPL-SCNC: 108 MMOL/L (ref 95–110)
CO2 SERPL-SCNC: 22 MMOL/L (ref 23–29)
CREAT SERPL-MCNC: 1.6 MG/DL (ref 0.5–1.4)
DIFFERENTIAL METHOD: ABNORMAL
EOSINOPHIL # BLD AUTO: 0.1 K/UL (ref 0–0.5)
EOSINOPHIL NFR BLD: 2 % (ref 0–8)
ERYTHROCYTE [DISTWIDTH] IN BLOOD BY AUTOMATED COUNT: 12.4 % (ref 11.5–14.5)
EST. GFR  (NO RACE VARIABLE): 34 ML/MIN/1.73 M^2
GLUCOSE SERPL-MCNC: 105 MG/DL (ref 70–110)
HCT VFR BLD AUTO: 31.8 % (ref 37–48.5)
HGB BLD-MCNC: 9.8 G/DL (ref 12–16)
IMM GRANULOCYTES # BLD AUTO: 0.02 K/UL (ref 0–0.04)
IMM GRANULOCYTES NFR BLD AUTO: 0.4 % (ref 0–0.5)
LYMPHOCYTES # BLD AUTO: 1.2 K/UL (ref 1–4.8)
LYMPHOCYTES NFR BLD: 21.2 % (ref 18–48)
MAGNESIUM SERPL-MCNC: 1.7 MG/DL (ref 1.6–2.6)
MCH RBC QN AUTO: 30.5 PG (ref 27–31)
MCHC RBC AUTO-ENTMCNC: 30.8 G/DL (ref 32–36)
MCV RBC AUTO: 99 FL (ref 82–98)
MONOCYTES # BLD AUTO: 0.3 K/UL (ref 0.3–1)
MONOCYTES NFR BLD: 6.2 % (ref 4–15)
NEUTROPHILS # BLD AUTO: 3.8 K/UL (ref 1.8–7.7)
NEUTROPHILS NFR BLD: 69.7 % (ref 38–73)
NRBC BLD-RTO: 0 /100 WBC
PLATELET # BLD AUTO: 213 K/UL (ref 150–450)
PMV BLD AUTO: 9.5 FL (ref 9.2–12.9)
POTASSIUM SERPL-SCNC: 3.6 MMOL/L (ref 3.5–5.1)
RBC # BLD AUTO: 3.21 M/UL (ref 4–5.4)
SODIUM SERPL-SCNC: 139 MMOL/L (ref 136–145)
TB INDURATION 48 - 72 HR READ: 0 MM
WBC # BLD AUTO: 5.51 K/UL (ref 3.9–12.7)

## 2023-10-15 PROCEDURE — G0378 HOSPITAL OBSERVATION PER HR: HCPCS

## 2023-10-15 PROCEDURE — 25000003 PHARM REV CODE 250: Performed by: HOSPITALIST

## 2023-10-15 PROCEDURE — 85025 COMPLETE CBC W/AUTO DIFF WBC: CPT | Performed by: FAMILY MEDICINE

## 2023-10-15 PROCEDURE — 96361 HYDRATE IV INFUSION ADD-ON: CPT

## 2023-10-15 PROCEDURE — 36415 COLL VENOUS BLD VENIPUNCTURE: CPT | Performed by: FAMILY MEDICINE

## 2023-10-15 PROCEDURE — 80048 BASIC METABOLIC PNL TOTAL CA: CPT | Performed by: FAMILY MEDICINE

## 2023-10-15 PROCEDURE — 25000003 PHARM REV CODE 250: Performed by: FAMILY MEDICINE

## 2023-10-15 PROCEDURE — 83735 ASSAY OF MAGNESIUM: CPT | Performed by: FAMILY MEDICINE

## 2023-10-15 RX ORDER — AMOXICILLIN AND CLAVULANATE POTASSIUM 500; 125 MG/1; MG/1
1 TABLET, FILM COATED ORAL 3 TIMES DAILY
Status: DISCONTINUED | OUTPATIENT
Start: 2023-10-15 | End: 2023-10-17 | Stop reason: HOSPADM

## 2023-10-15 RX ADMIN — HYDROCODONE BITARTRATE AND ACETAMINOPHEN 1 TABLET: 7.5; 325 TABLET ORAL at 05:10

## 2023-10-15 RX ADMIN — AMOXICILLIN AND CLAVULANATE POTASSIUM 500 MG: 500; 125 TABLET, FILM COATED ORAL at 03:10

## 2023-10-15 RX ADMIN — ACETAMINOPHEN 650 MG: 325 TABLET ORAL at 09:10

## 2023-10-15 RX ADMIN — AMOXICILLIN AND CLAVULANATE POTASSIUM 500 MG: 500; 125 TABLET, FILM COATED ORAL at 08:10

## 2023-10-15 RX ADMIN — SODIUM CHLORIDE: 9 INJECTION, SOLUTION INTRAVENOUS at 09:10

## 2023-10-15 NOTE — PROGRESS NOTES
[facility] Medicine  Progress Note    Patient Name: Traci Blanca  MRN: [id number]  Patient Class: OP- Observation   Admission Date: 10/10/2023  Length of Stay: 0 days  Attending Physician: Parish Hamlin MD  Primary Care Provider: Tsering, Primary Doctor        Subjective:     Principal Problem:Acute renal failure superimposed on stage 3 chronic kidney disease        HPI:  Traci Blanca 72 y.o. female with history of CVA with residual left sided deficits, CKD3, history of PE no longer on anticoagulation presents to the hospital with a chief complaint of syncope.  She reports multiple syncopal episodes over the last week which she attributes to dehydration.  She reports she is been unable to eat for the last week as she was unable to care for her activities of daily since a fall and resultant right shoulder pain 2 weeks ago.  She reports she was examined at a previous hospital.  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  She denies fever chest pain shortness of breath nausea vomiting abdominal leg swelling melena hematuria hematemesis dizziness.  She reports she was unable to the tolerate Eliquis for previously diagnosed PE due to bleeding complications.    In the ED, chest x-ray with prominence of the aortic arch correlation creatinine 2.6 lactic acid negative troponin negative BNP negative.      Overview/Hospital Course:  Traci Blanca 72 y.o. female with history of CVA with residual left sided deficits, CKD3, history of PE no longer on anticoagulation presents to the hospital with a chief complaint of syncope.  She reports multiple syncopal episodes over the last week which she attributes to dehydration.   Patient has ARF on CKD and is on IVF,changed  to bicarb for acidosis - metabolic acidosis has resolved  Urine culture shows E.coli UTI - continue IV rocephin during hospitalization  Rt knee with significant swelling, not warm to touch - xray imaging shows significant  effusion  Orthopedic surgery consulted - s/p rt knee aspiration - cultures pending but low likelihood of infection at this point  Cardiology was consulted for bigeminy/trigeminy - appreciate assistance  PT/OT - recommend SNF and case management assisting in this  Will continue to monitor electrolytes and treat as needed    10/14 - met acidosis has resolved, urine culture and sensitivities reviewed showing e.coli. Rt knee cultures pending but low likelihood of infection. She is tearful and upset this am as it is anniversary of her  death. Additionally reports increased pain in rt knee, will start pain medication for severe pain. Await aspiration cultures and anticipate d/c to SNF      Interval History: no new complaints.    Review of Systems   HENT:  Negative for ear discharge and ear pain.    Eyes:  Negative for discharge and itching.   Endocrine: Negative for cold intolerance and heat intolerance.   Neurological:  Negative for seizures and syncope.     Objective:     Vital Signs (Most Recent):  Temp: 97.6 °F (36.4 °C) (10/15/23 1142)  Pulse: 78 (10/15/23 1142)  Resp: 14 (10/15/23 1142)  BP: 130/80 (10/15/23 1142)  SpO2: 98 % (10/15/23 1142) Vital Signs (24h Range):  Temp:  [97.6 °F (36.4 °C)-98.1 °F (36.7 °C)] 97.6 °F (36.4 °C)  Pulse:  [52-78] 78  Resp:  [14-19] 14  SpO2:  [95 %-98 %] 98 %  BP: (116-134)/(66-84) 130/80     Weight: 72.7 kg (160 lb 5.1 oz)  Body mass index is 23 kg/m².    Intake/Output Summary (Last 24 hours) at 10/15/2023 1418  Last data filed at 10/14/2023 1810  Gross per 24 hour   Intake 120 ml   Output 500 ml   Net -380 ml         Physical Exam  Vitals and nursing note reviewed.   Constitutional:       General: She is not in acute distress.     Appearance: She is well-developed. She is not diaphoretic.   HENT:      Head: Normocephalic and atraumatic.      Right Ear: External ear normal.      Left Ear: External ear normal.   Eyes:      General:         Right eye: No discharge.         Left  eye: No discharge.      Conjunctiva/sclera: Conjunctivae normal.   Neck:      Thyroid: No thyromegaly.   Cardiovascular:      Rate and Rhythm: Normal rate and regular rhythm.      Heart sounds: No murmur heard.  Pulmonary:      Effort: Pulmonary effort is normal. No respiratory distress.      Breath sounds: Normal breath sounds.   Abdominal:      General: Bowel sounds are normal. There is no distension.      Palpations: Abdomen is soft. There is no mass.      Tenderness: There is no abdominal tenderness.   Musculoskeletal:         General: Tenderness present. No deformity.      Cervical back: Normal range of motion and neck supple.      Right lower leg: No edema.      Left lower leg: No edema.      Comments: Right knee swelling.   Skin:     General: Skin is warm and dry.   Neurological:      Mental Status: She is alert and oriented to person, place, and time.      Sensory: No sensory deficit.      Comments: Right sided upper and lower paralysis. Baseline per patient since previous CVA   Psychiatric:         Mood and Affect: Mood normal.         Behavior: Behavior normal.             Significant Labs: All pertinent labs within the past 24 hours have been reviewed.  BMP:   Recent Labs   Lab 10/15/23  0346         K 3.6      CO2 22*   BUN 28*   CREATININE 1.6*   CALCIUM 8.2*   MG 1.7     CBC:   Recent Labs   Lab 10/15/23  0346   WBC 5.51   HGB 9.8*   HCT 31.8*          Significant Imaging: I have reviewed all pertinent imaging results/findings within the past 24 hours.      Assessment/Plan:      * Acute renal failure superimposed on stage 3 chronic kidney disease  Presents with Cr of 2.6, baseline of 1.9 to 2.1 suspect related to decreased PO intake as unable to eat due to baseline paralysis and right sided shoulder pain now limit ADLs  Started on IVF  Renal dose medications, avoid nephrotoxic drugs, monitor intake and output  Home telmisartan held  Creat now back to baseline.  Awaiting SNF  "placement.      Aortic root dilatation  Initially concern based on chest xray with CT imaging showing aneurysmal dilatation of ascending thoracic aorta . Based on documentation it appears this was seen previously as well.   Cardiology consult - 4.6 cm on echo and recommend repeat echo in 6 months      Wound of sacral region        Hypomagnesemia  Patient has Abnormal Magnesium: hypomagnesemia. Will continue to monitor electrolytes closely. Will replace the affected electrolytes and repeat labs to be done after interventions completed. The patient's magnesium results have been reviewed and are listed below.  No results for input(s): "MG" in the last 24 hours.     PVC (premature ventricular contraction)  Replace electrolytes as needed.    Metabolic acidosis  Started on bicarb drip  Resolved now       Urinary tract infection without hematuria  E coli UTI.  Change to PO Augmentin.      History of CVA (cerebrovascular accident)  With residual left sided deficits. As above. PT/OT consulted    Syncope  Reports multiple syncopal episodes over the last week, suspect related dehydration as has been eating less due to inability to care for herself since fall with shoulder pain.   No syncopal symptoms since admits.    Shoulder pain  Has shoulder pain since fall over 2 weeks ago. X-ray without fractures  PT/OT consulted  Ortho following.      VTE Risk Mitigation (From admission, onward)         Ordered     heparin (porcine) injection 5,000 Units  Every 8 hours         10/11/23 1010     IP VTE HIGH RISK PATIENT  Once         10/10/23 2012     Place sequential compression device  Until discontinued         10/10/23 2012     Place CALE hose  Until discontinued         10/10/23 2012                Discharge Planning   ANDREA: 10/13/2023     Code Status: Full Code   Is the patient medically ready for discharge?:     Reason for patient still in hospital (select all that apply): Pending disposition  Discharge Plan A: Skilled Nursing " Facility                  Parish Hamlin MD  Department of Hospital Medicine   [facility] [place] [facility]

## 2023-10-15 NOTE — PROGRESS NOTES
Results from aspiration reviewed not consistent with septic arthritis, cultures negative      Imaging reveals advanced arthritis    Results also not consistent with crystalline arthropathy    No further surgical intervention indicated at this time.    Patient may follow up in clinic as an outpatient  Patient does report some right shoulder pain     Physical exam:   Mild effusion of the knee no erythema no laceration or abrasion   Mild pain and crepitus with motion of right shoulder near full range of motion no erythema no signs of infection neurovascular intact right upper extremity     72-year-old female with right knee effusion with results not consistent with infection or crystalline arthropathy she does report some right shoulder pain she did not report any specific trauma will order a shoulder x-ray of the right side no surgical intervention likely.        Warner Mcclendon MD  [facility]

## 2023-10-15 NOTE — SUBJECTIVE & OBJECTIVE
Interval History: no new complaints.    Review of Systems   HENT:  Negative for ear discharge and ear pain.    Eyes:  Negative for discharge and itching.   Endocrine: Negative for cold intolerance and heat intolerance.   Neurological:  Negative for seizures and syncope.     Objective:     Vital Signs (Most Recent):  Temp: 97.6 °F (36.4 °C) (10/15/23 1142)  Pulse: 78 (10/15/23 1142)  Resp: 14 (10/15/23 1142)  BP: 130/80 (10/15/23 1142)  SpO2: 98 % (10/15/23 1142) Vital Signs (24h Range):  Temp:  [97.6 °F (36.4 °C)-98.1 °F (36.7 °C)] 97.6 °F (36.4 °C)  Pulse:  [52-78] 78  Resp:  [14-19] 14  SpO2:  [95 %-98 %] 98 %  BP: (116-134)/(66-84) 130/80     Weight: 72.7 kg (160 lb 5.1 oz)  Body mass index is 23 kg/m².    Intake/Output Summary (Last 24 hours) at 10/15/2023 1418  Last data filed at 10/14/2023 1810  Gross per 24 hour   Intake 120 ml   Output 500 ml   Net -380 ml         Physical Exam  Vitals and nursing note reviewed.   Constitutional:       General: She is not in acute distress.     Appearance: She is well-developed. She is not diaphoretic.   HENT:      Head: Normocephalic and atraumatic.      Right Ear: External ear normal.      Left Ear: External ear normal.   Eyes:      General:         Right eye: No discharge.         Left eye: No discharge.      Conjunctiva/sclera: Conjunctivae normal.   Neck:      Thyroid: No thyromegaly.   Cardiovascular:      Rate and Rhythm: Normal rate and regular rhythm.      Heart sounds: No murmur heard.  Pulmonary:      Effort: Pulmonary effort is normal. No respiratory distress.      Breath sounds: Normal breath sounds.   Abdominal:      General: Bowel sounds are normal. There is no distension.      Palpations: Abdomen is soft. There is no mass.      Tenderness: There is no abdominal tenderness.   Musculoskeletal:         General: Tenderness present. No deformity.      Cervical back: Normal range of motion and neck supple.      Right lower leg: No edema.      Left lower leg: No  edema.      Comments: Right knee swelling.   Skin:     General: Skin is warm and dry.   Neurological:      Mental Status: She is alert and oriented to person, place, and time.      Sensory: No sensory deficit.      Comments: Right sided upper and lower paralysis. Baseline per patient since previous CVA   Psychiatric:         Mood and Affect: Mood normal.         Behavior: Behavior normal.             Significant Labs: All pertinent labs within the past 24 hours have been reviewed.  BMP:   Recent Labs   Lab 10/15/23  0346         K 3.6      CO2 22*   BUN 28*   CREATININE 1.6*   CALCIUM 8.2*   MG 1.7     CBC:   Recent Labs   Lab 10/15/23  0346   WBC 5.51   HGB 9.8*   HCT 31.8*          Significant Imaging: I have reviewed all pertinent imaging results/findings within the past 24 hours.

## 2023-10-16 PROCEDURE — 97542 WHEELCHAIR MNGMENT TRAINING: CPT | Mod: CQ,59

## 2023-10-16 PROCEDURE — 25000003 PHARM REV CODE 250: Performed by: HOSPITALIST

## 2023-10-16 PROCEDURE — 25000003 PHARM REV CODE 250: Performed by: PHYSICIAN ASSISTANT

## 2023-10-16 PROCEDURE — 96361 HYDRATE IV INFUSION ADD-ON: CPT

## 2023-10-16 PROCEDURE — 97535 SELF CARE MNGMENT TRAINING: CPT

## 2023-10-16 PROCEDURE — G0378 HOSPITAL OBSERVATION PER HR: HCPCS

## 2023-10-16 PROCEDURE — 97530 THERAPEUTIC ACTIVITIES: CPT

## 2023-10-16 PROCEDURE — 97530 THERAPEUTIC ACTIVITIES: CPT | Mod: CQ

## 2023-10-16 PROCEDURE — 25000003 PHARM REV CODE 250: Performed by: FAMILY MEDICINE

## 2023-10-16 RX ORDER — LACTULOSE 10 G/15ML
200 SOLUTION ORAL; RECTAL 3 TIMES DAILY
Status: DISCONTINUED | OUTPATIENT
Start: 2023-10-16 | End: 2023-10-17

## 2023-10-16 RX ADMIN — SENNOSIDES AND DOCUSATE SODIUM 1 TABLET: 50; 8.6 TABLET ORAL at 03:10

## 2023-10-16 RX ADMIN — AMOXICILLIN AND CLAVULANATE POTASSIUM 500 MG: 500; 125 TABLET, FILM COATED ORAL at 02:10

## 2023-10-16 RX ADMIN — ACETAMINOPHEN 650 MG: 325 TABLET ORAL at 01:10

## 2023-10-16 RX ADMIN — AMOXICILLIN AND CLAVULANATE POTASSIUM 500 MG: 500; 125 TABLET, FILM COATED ORAL at 09:10

## 2023-10-16 RX ADMIN — AMOXICILLIN AND CLAVULANATE POTASSIUM 500 MG: 500; 125 TABLET, FILM COATED ORAL at 10:10

## 2023-10-16 RX ADMIN — SODIUM CHLORIDE: 9 INJECTION, SOLUTION INTRAVENOUS at 06:10

## 2023-10-16 RX ADMIN — ACETAMINOPHEN 650 MG: 325 TABLET ORAL at 06:10

## 2023-10-16 RX ADMIN — LACTULOSE 200 G: 10 SOLUTION ORAL; RECTAL at 11:10

## 2023-10-16 NOTE — PROGRESS NOTES
[facility]  Progress Note    Patient Name: Traci Blanca  MRN: [id number]  Patient Class: OP- Observation   Admission Date: 10/10/2023  Length of Stay: 0 days  Attending Physician: Jane Leyva, *  Primary Care Provider: [place], Primary Doctor        Subjective:     Principal Problem:Acute renal failure superimposed on stage 3 chronic kidney disease        HPI:  Traci Blanca 72 y.o. female with history of CVA with residual left sided deficits, CKD3, history of PE no longer on anticoagulation presents to the hospital with a chief complaint of syncope.  She reports multiple syncopal episodes over the last week which she attributes to dehydration.  She reports she is been unable to eat for the last week as she was unable to care for her activities of daily since a fall and resultant right shoulder pain 2 weeks ago.  She reports she was examined at a previous hospital.  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  She denies fever chest pain shortness of breath nausea vomiting abdominal leg swelling melena hematuria hematemesis dizziness.  She reports she was unable to the tolerate Eliquis for previously diagnosed PE due to bleeding complications.    In the ED, chest x-ray with prominence of the aortic arch correlation creatinine 2.6 lactic acid negative troponin negative BNP negative.      Overview/Hospital Course:  Traci Blanca 72 y.o. female with history of CVA with residual left sided deficits, CKD3, history of PE no longer on anticoagulation presents to the hospital with a chief complaint of syncope.  She reports multiple syncopal episodes over the last week which she attributes to dehydration.   Patient has ARF on CKD and is on IVF,changed  to bicarb for acidosis - metabolic acidosis has resolved  Urine culture shows E.coli UTI - continue IV rocephin during hospitalization  Rt knee with significant swelling, not warm to touch - xray imaging shows  significant effusion  Orthopedic surgery consulted - s/p rt knee aspiration - cultures pending but low likelihood of infection at this point  Cardiology was consulted for bigeminy/trigeminy - appreciate assistance  PT/OT - recommend SNF and case management assisting in this  Will continue to monitor electrolytes and treat as needed    10/14 - met acidosis has resolved, urine culture and sensitivities reviewed showing e.coli. Rt knee cultures pending but low likelihood of infection. She is tearful and upset this am as it is anniversary of her  death. Additionally reports increased pain in rt knee, will start pain medication for severe pain.aspiration cultures is negative,plan for SNF       Interval History: no new complaints.    Review of Systems   HENT:  Negative for ear discharge and ear pain.    Eyes:  Negative for discharge and itching.   Endocrine: Negative for cold intolerance and heat intolerance.   Neurological:  Negative for seizures and syncope.     Objective:     Vital Signs (Most Recent):  Temp: 98.5 °F (36.9 °C) (10/16/23 0736)  Pulse: (!) 48 (10/16/23 0736)  Resp: 20 (10/16/23 0736)  BP: 122/73 (10/16/23 0736)  SpO2: 98 % (10/16/23 0736) Vital Signs (24h Range):  Temp:  [97.5 °F (36.4 °C)-98.7 °F (37.1 °C)] 98.5 °F (36.9 °C)  Pulse:  [48-81] 48  Resp:  [14-20] 20  SpO2:  [96 %-98 %] 98 %  BP: (116-133)/(64-84) 122/73     Weight: 72.7 kg (160 lb 5.1 oz)  Body mass index is 23 kg/m².    Intake/Output Summary (Last 24 hours) at 10/16/2023 0931  Last data filed at 10/16/2023 0603  Gross per 24 hour   Intake --   Output 800 ml   Net -800 ml           Physical Exam  Vitals and nursing note reviewed.   Constitutional:       General: She is not in acute distress.     Appearance: She is well-developed. She is not diaphoretic.   HENT:      Head: Normocephalic and atraumatic.      Right Ear: External ear normal.      Left Ear: External ear normal.   Eyes:      General:         Right eye: No discharge.          Left eye: No discharge.      Conjunctiva/sclera: Conjunctivae normal.   Neck:      Thyroid: No thyromegaly.   Cardiovascular:      Rate and Rhythm: Normal rate and regular rhythm.      Heart sounds: No murmur heard.  Pulmonary:      Effort: Pulmonary effort is normal. No respiratory distress.      Breath sounds: Normal breath sounds.   Abdominal:      General: Bowel sounds are normal. There is no distension.      Palpations: Abdomen is soft. There is no mass.      Tenderness: There is no abdominal tenderness.   Musculoskeletal:         General: Tenderness present. No deformity.      Cervical back: Normal range of motion and neck supple.      Right lower leg: No edema.      Left lower leg: No edema.      Comments: Right knee swelling.   Skin:     General: Skin is warm and dry.   Neurological:      Mental Status: She is alert and oriented to person, place, and time.      Sensory: No sensory deficit.      Comments: Right sided upper and lower paralysis. Baseline per patient since previous CVA   Psychiatric:         Mood and Affect: Mood normal.         Behavior: Behavior normal.             Significant Labs: All pertinent labs within the past 24 hours have been reviewed.  BMP:   Recent Labs   Lab 10/15/23  0346         K 3.6      CO2 22*   BUN 28*   CREATININE 1.6*   CALCIUM 8.2*   MG 1.7       CBC:   Recent Labs   Lab 10/15/23  0346   WBC 5.51   HGB 9.8*   HCT 31.8*            Significant Imaging: I have reviewed all pertinent imaging results/findings within the past 24 hours.      Assessment/Plan:      * Acute renal failure superimposed on stage 3 chronic kidney disease  Presents with Cr of 2.6, baseline of 1.9 to 2.1 suspect related to decreased PO intake as unable to eat due to baseline paralysis and right sided shoulder pain now limit ADLs  Started on IVF  Renal dose medications, avoid nephrotoxic drugs, monitor intake and output  Home telmisartan held  Creat now back to  "baseline.  Awaiting SNF placement.      Aortic root dilatation  Initially concern based on chest xray with CT imaging showing aneurysmal dilatation of ascending thoracic aorta . Based on documentation it appears this was seen previously as well.   Cardiology consult - 4.6 cm on echo and recommend repeat echo in 6 months,referraklm is done.      Wound of sacral region        Hypomagnesemia  Patient has Abnormal Magnesium: hypomagnesemia. Will continue to monitor electrolytes closely. Will replace the affected electrolytes and repeat labs to be done after interventions completed. The patient's magnesium results have been reviewed and are listed below.  No results for input(s): "MG" in the last 24 hours.     PVC (premature ventricular contraction)  Replace electrolytes as needed.    Metabolic acidosis  Started on bicarb drip  Resolved now       Urinary tract infection without hematuria  E coli UTI.  Change to PO Augmentin.      History of CVA (cerebrovascular accident)  With residual left sided deficits. As above. PT/OT consulted    Syncope  Reports multiple syncopal episodes over the last week, suspect related dehydration as has been eating less due to inability to care for herself since fall with shoulder pain.   No syncopal symptoms since admits.    Shoulder pain  Has shoulder pain since fall over 2 weeks ago. X-ray without fractures  PT/OT consulted  Ortho following.      VTE Risk Mitigation (From admission, onward)         Ordered     heparin (porcine) injection 5,000 Units  Every 8 hours         10/11/23 1010     IP VTE HIGH RISK PATIENT  Once         10/10/23 2012     Place sequential compression device  Until discontinued         10/10/23 2012     Place CALE hose  Until discontinued         10/10/23 2012                Discharge Planning   ANDREA: 10/16/2023     Code Status: Full Code   Is the patient medically ready for discharge?:     Reason for patient still in hospital (select all that apply): Patient trending " condition  Discharge Plan A: Skilled Nursing Facility                  Jane Leyva MD  Department of Hospital Medicine   [facility]

## 2023-10-16 NOTE — PLAN OF CARE
10/16/23 1411   Final Note   Assessment Type Final Discharge Note   Anticipated Discharge Disposition SNF   Hospital Resources/Appts/Education Provided Post-Acute resouces added to AVS   Post-Acute Status   Post-Acute Authorization Placement   Post-Acute Placement Status Set-up Complete/Auth obtained   Discharge Delays (!) PFC Arranged Transportation

## 2023-10-16 NOTE — PLAN OF CARE
[facility]     Department of Hospital Medicine     [address] [place] [address]     [phone] after hours  [phone] fax       NURSING HOME ORDERS    10.17.23  Admit to Nursing Home:    Skilled Bed                                               Diagnoses:  Active Hospital Problems    Diagnosis  POA    *Acute renal failure superimposed on stage 3 chronic kidney disease [N17.9, N18.30]  Yes    Wound of sacral region [S31.000A]  Yes    Aortic root dilatation [I77.810]  Yes    Hypomagnesemia [E83.42]  Yes    Urinary tract infection without hematuria [N39.0]  Yes    Metabolic acidosis [E87.20]  Yes    PVC (premature ventricular contraction) [I49.3]  Yes    Shoulder pain [M25.519]  Yes    Syncope [R55]  Yes    History of CVA (cerebrovascular accident) [Z86.73]  Not Applicable      Resolved Hospital Problems   No resolved problems to display.       Patient is homebound due to:  Acute renal failure superimposed on stage 3 chronic kidney disease    Allergies:Review of patient's allergies indicates:  No Known Allergies    Vitals:       Every shift (Skilled Nursing patients)    Diet: renal,thin     Acitivities:      - Up in a chair each morning as tolerated   - Ambulate with assistance to bathroom     - May use walker, cane, or self-propelled wheelchair   - Weight bearing: as tolerated      LABS:  Per facility protocol    Nursing Precautions:     - Aspiration precautions:                        -  Upright 90 degrees befor during and after meals                     - Fall precautions per nursing home protocol     - Decubitus precautions:        -  for positioning   - Pressure reducing foam mattress   - Turn patient every two hours. Use wedge pillows to anchor patient    CONSULTS:      Physical Therapy to evaluate and treat five time a week      Occupational Therapy to evaluate and treat five time a week          MISCELLANEOUS CARE:      Routine Skin for  Bedridden Patients:  Apply moisture barrier cream to all    skin folds and wet areas in perineal area daily and after baths and                           all bowel movements.                                 Medications: Discontinue all previous medication orders, if any. See new list below.       Medication List        START taking these medications      acetaminophen 325 MG tablet  Commonly known as: TYLENOL  Take 2 tablets (650 mg total) by mouth every 4 (four) hours as needed for  pain             CONTINUE taking these medications      ergocalciferol 50,000 unit Cap  Commonly known as: ERGOCALCIFEROL  Take 1 capsule by mouth Daily.     VITAMIN B-12 100 MCG tablet  Generic drug: cyanocobalamin  Take 100 mcg by mouth once daily.            STOP taking these medications      telmisartan 80 MG Tab  Commonly known as: MICARDIS                    _________________________________  Jane Leyva MD  10.17.23

## 2023-10-16 NOTE — PLAN OF CARE
Call report provided to pts nurse Connie and advised that transportation will be requested for 3:30pm

## 2023-10-16 NOTE — PT/OT/SLP PROGRESS
Physical Therapy Treatment    Patient Name:  Traci Blanca   MRN:  [id number]    Recommendations:     Discharge Recommendations: Moderate Intensity Therapy  Discharge Equipment Recommendations:  (Ongoing assessment pending pt progress)  Barriers to discharge:  Pt is not functioning at Independent baseline following fall and is at increased risk for falls and readmission if she returns home at present time.    Assessment:     Traci Blanca is a 72 y.o. female admitted with a medical diagnosis of Acute renal failure superimposed on stage 3 chronic kidney disease.  She presents with the following impairments/functional limitations: weakness, impaired endurance, impaired self care skills, impaired functional mobility, impaired balance, decreased coordination, decreased upper extremity function, decreased lower extremity function, decreased safety awareness, pain, decreased ROM, impaired coordination, impaired fine motor, edema.    Pt required Max A x 2 for Bed Mobility, Dependence x 2 for transfer activity, and SBA for Wheelchair propelling in hallway (with 1 L turn and straight down in hallway), v/c to keep away from the wall. Pt still with c/o pain and swollen to R Knee.    Rehab Prognosis: Good; patient would benefit from acute skilled PT services to address these deficits and reach maximum level of function.    Recent Surgery: * No surgery found *      Plan:     During this hospitalization, patient to be seen  (3-5x/wk) to address the identified rehab impairments via therapeutic activities, therapeutic exercises, neuromuscular re-education, wheelchair management/training and progress toward the following goals:    Plan of Care Expires:  10/26/23    Subjective     Chief Complaint: pain to R Knee  Patient/Family Comments/goals: Pt agreed to participate.  Pain/Comfort:  Pain Rating 1:  (pt did not rate)  Location - Side 1: Right  Location 1: knee  Pain Addressed 1: Reposition, Distraction, Cessation of Activity,  "Pre-medicate for activity  Pain Rating Post-Intervention 1:  (pt did not rate)      Objective:     Communicated with nurse prior to session.  Patient found HOB elevated with peripheral IV, PureWick, telemetry upon PT entry to room.     General Precautions: Standard, fall  Orthopedic Precautions: N/A  Braces: N/A  Respiratory Status: Room air     Functional Mobility:  Bed Mobility:     Rolling Left:  maximal assistance  Rolling Right: maximal assistance  Scooting: anterior scoot to EOB and posterior scoot back in chair with dependence and of 2 persons  Bridging: toward HOB with dependence and of 2 persons  Supine to Sit: dependence and of 2 persons  Sit to Supine: dependence and of 2 persons  Transfers:   gait belt donned prior transfers  Bed <> Wheelchair: dependence and of 2 persons with  hand-held assist  using  Scoot Pivot  Balance: initially pt with posterior/Right leaning and required Dependence>>Max A for sitting balance, however, pt was able to sit with SBA>>Supervision after assistance from therapists to correct/re-educate her sitting posture  Wheelchair Propulsion:  Pt propelled Standard wheelchair x ~25 feet on Level tile with  Right upper extremity and Right lower extremity with Stand-by Assistance; v/c for  and not running to the wall in hallway, 1 rest break required 2* pain and fatigue. Pt stated "It feels good to be out here."      AM-PAC 6 CLICK MOBILITY  Turning over in bed (including adjusting bedclothes, sheets and blankets)?: 2  Sitting down on and standing up from a chair with arms (e.g., wheelchair, bedside commode, etc.): 1  Moving from lying on back to sitting on the side of the bed?: 2  Moving to and from a bed to a chair (including a wheelchair)?: 2  Need to walk in hospital room?: 1  Climbing 3-5 steps with a railing?: 1  Basic Mobility Total Score: 9       Treatment & Education:  Educated pt on importance of bed mobility and benefit of performing BLE ex throughout the day, pt " verbalized understanding.    BLE ex in seated 15 reps AAROM LAQ, AP    Patient left HOB elevated with B SCD, B Pressure Relief Boots, BUE elevated by pillows, R side offloaded by pillow, fold sheet between knees, tray table at bedside, all lines intact, call button in reach, bed alarm on, and nurse notified.    GOALS:   Multidisciplinary Problems       Physical Therapy Goals          Problem: Physical Therapy    Goal Priority Disciplines Outcome Goal Variances Interventions   Physical Therapy Goal     PT, PT/OT Ongoing, Progressing     Description: Goals to be met by: 10/26/23     Patient will increase functional independence with mobility by performin. Supine to sit with Modified [place]  2. Rolling to Left and Right with Modified [place].  3. Bed to chair transfer with Modified [place]   4. Wheelchair propulsion x50-100 feet with Modified [place]   5. Sitting at edge of bed x15 minutes with Modified [place]  6. Lower extremity exercise program x10 reps per handout, with independence                         Time Tracking:     PT Received On: 10/16/23  PT Start Time: 1041     PT Stop Time: 1128  PT Total Time (min): 47 min     Billable Minutes: Therapeutic Activity 11 min and Train/Wheelchair Management 12 min   (total time 47 min with OT)    Treatment Type: Treatment  PT/PTA: PTA     Number of PTA visits since last PT visit: 1     10/16/2023

## 2023-10-16 NOTE — PLAN OF CARE
Call received from Tita with [facility] stating that PT/OT will need to work with pt on today and put a note in before auth can be provided.    Message sent to PT/OT to advise of this

## 2023-10-16 NOTE — PLAN OF CARE
Problem: Physical Therapy  Goal: Physical Therapy Goal  Description: Goals to be met by: 10/26/23     Patient will increase functional independence with mobility by performin. Supine to sit with Modified [place]  2. Rolling to Left and Right with Modified [place].  3. Bed to chair transfer with Modified [place]   4. Wheelchair propulsion x50-100 feet with Modified [place]   5. Sitting at edge of bed x15 minutes with Modified [place]  6. Lower extremity exercise program x10 reps per handout, with independence    Outcome: Ongoing, Progressing   Pt required Max A x 2 for Bed Mobility, Dependence x 2 for transfer activity, and SBA for Wheelchair propelling in hallway (with 1 L turn and straight down in hallway), v/c to keep away from the wall. Pt still with c/o pain and swollen to R Knee.

## 2023-10-16 NOTE — SUBJECTIVE & OBJECTIVE
Interval History: no new complaints.    Review of Systems   HENT:  Negative for ear discharge and ear pain.    Eyes:  Negative for discharge and itching.   Endocrine: Negative for cold intolerance and heat intolerance.   Neurological:  Negative for seizures and syncope.     Objective:     Vital Signs (Most Recent):  Temp: 98.5 °F (36.9 °C) (10/16/23 0736)  Pulse: (!) 48 (10/16/23 0736)  Resp: 20 (10/16/23 0736)  BP: 122/73 (10/16/23 0736)  SpO2: 98 % (10/16/23 0736) Vital Signs (24h Range):  Temp:  [97.5 °F (36.4 °C)-98.7 °F (37.1 °C)] 98.5 °F (36.9 °C)  Pulse:  [48-81] 48  Resp:  [14-20] 20  SpO2:  [96 %-98 %] 98 %  BP: (116-133)/(64-84) 122/73     Weight: 72.7 kg (160 lb 5.1 oz)  Body mass index is 23 kg/m².    Intake/Output Summary (Last 24 hours) at 10/16/2023 0947  Last data filed at 10/16/2023 0603  Gross per 24 hour   Intake --   Output 800 ml   Net -800 ml           Physical Exam  Vitals and nursing note reviewed.   Constitutional:       General: She is not in acute distress.     Appearance: She is well-developed. She is not diaphoretic.   HENT:      Head: Normocephalic and atraumatic.      Right Ear: External ear normal.      Left Ear: External ear normal.   Eyes:      General:         Right eye: No discharge.         Left eye: No discharge.      Conjunctiva/sclera: Conjunctivae normal.   Neck:      Thyroid: No thyromegaly.   Cardiovascular:      Rate and Rhythm: Normal rate and regular rhythm.      Heart sounds: No murmur heard.  Pulmonary:      Effort: Pulmonary effort is normal. No respiratory distress.      Breath sounds: Normal breath sounds.   Abdominal:      General: Bowel sounds are normal. There is no distension.      Palpations: Abdomen is soft. There is no mass.      Tenderness: There is no abdominal tenderness.   Musculoskeletal:         General: Tenderness present. No deformity.      Cervical back: Normal range of motion and neck supple.      Right lower leg: No edema.      Left lower leg: No  edema.      Comments: Right knee swelling.   Skin:     General: Skin is warm and dry.   Neurological:      Mental Status: She is alert and oriented to person, place, and time.      Sensory: No sensory deficit.      Comments: Right sided upper and lower paralysis. Baseline per patient since previous CVA   Psychiatric:         Mood and Affect: Mood normal.         Behavior: Behavior normal.             Significant Labs: All pertinent labs within the past 24 hours have been reviewed.  BMP:   Recent Labs   Lab 10/15/23  0346         K 3.6      CO2 22*   BUN 28*   CREATININE 1.6*   CALCIUM 8.2*   MG 1.7       CBC:   Recent Labs   Lab 10/15/23  0346   WBC 5.51   HGB 9.8*   HCT 31.8*            Significant Imaging: I have reviewed all pertinent imaging results/findings within the past 24 hours.

## 2023-10-16 NOTE — NURSING
Scheduled medications given crushed in pudding, w/out diffculty. Cont IVF. Tele #[id number]. SCDs in place. Pt complaint of discomfort; prn tylenol given. Pt remains free from fall/injury. Repositioned w/ assist. Vitals assessed. Pt on room air; no distress noted. Safety measures maintained. Will cont to monitor

## 2023-10-16 NOTE — DISCHARGE SUMMARY
Patient notified via News in [place] regarding test results by Dr Dreyer.  Seen by patient Lis Mcgowan on 9/9/2022  6:29 PM  Electronically Signed By: NATHANAEL Turner   [facility] Medicine  Discharge Summary      Patient Name: Traci Blanca  MRN: [id number]  TILA: [id number]  Patient Class: OP- Observation  Admission Date: 10/10/2023  Hospital Length of Stay:7 days   Discharge Date and Time: 10.17.23  Attending Physician: Jane Leyva, *   Discharging Provider: Jane Leyva MD  Primary Care Provider: Tsering, Primary Doctor    Primary Care Team: Networked reference to record PCT     HPI:   Traci Blanca 72 y.o. female with history of CVA with residual left sided deficits, CKD3, history of PE no longer on anticoagulation presents to the hospital with a chief complaint of syncope.  She reports multiple syncopal episodes over the last week which she attributes to dehydration.  She reports she is been unable to eat for the last week as she was unable to care for her activities of daily since a fall and resultant right shoulder pain 2 weeks ago.  She reports she was examined at a previous hospital.  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  She denies fever chest pain shortness of breath nausea vomiting abdominal leg swelling melena hematuria hematemesis dizziness.  She reports she was unable to the tolerate Eliquis for previously diagnosed PE due to bleeding complications.    In the ED, chest x-ray with prominence of the aortic arch correlation creatinine 2.6 lactic acid negative troponin negative BNP negative.      * No surgery found *      Hospital Course:   Traci Blanca 72 y.o. female with history of CVA with residual left sided deficits, CKD3, history of PE no longer on anticoagulation presents to the hospital with a chief complaint of syncope.  She reports multiple syncopal episodes over the last week which she attributes to dehydration.   Patient has ARF on CKD and is on IVF,changed  to bicarb for acidosis - metabolic acidosis has resolved  Urine culture shows E.coli UTI - treated with  IV rocephin during  hospitalization  Rt knee with significant swelling, not warm to touch - xray imaging shows significant effusion  Orthopedic surgery consulted - s/p rt knee aspiration - cultures was negative,likely arthritis.  Cardiology was consulted for bigeminy/trigeminy - Echo. Showed preserved EF.  PT/OT - recommend SNF and case management assisting in this  Will continue to monitor electrolytes and treat as needed  Patient was discharged SNF with PCP follow up.       Goals of Care Treatment Preferences:  Code Status: Full Code      Consults:   Consults (From admission, onward)          Status Ordering Provider     Inpatient consult to Social Work  Once        Provider:  (Not yet assigned)    Acknowledged ADIN TOWNSEND     Inpatient consult to Orthopedic Surgery  Once        Provider:  Warner Mcclendon MD    Completed KATYA POMPA     Inpatient consult to Social Work  Once        Provider:  (Not yet assigned)    Completed KATYA POMPA     Inpatient consult to Cardiology  Once        Provider:  Kelsey Kebede MD    Completed KATYA POMPA     Case Management/  Once        Provider:  (Not yet assigned)    Completed DONTRELL GARCIA     Inpatient consult to Registered Dietitian/Nutritionist  Once        Provider:  (Not yet assigned)    Completed ADEEL CONTEH     Inpatient consult to Social Work  Once        Provider:  (Not yet assigned)    Completed AHSAN ROMERO            No new Assessment & Plan notes have been filed under this hospital service since the last note was generated.  Service: Hospital Medicine    Final Active Diagnoses:    Diagnosis Date Noted POA    PRINCIPAL PROBLEM:  Acute renal failure superimposed on stage 3 chronic kidney disease [N17.9, N18.30] 10/10/2023 Yes    Wound of sacral region [S31.000A] 10/13/2023 Yes    Aortic root dilatation [I77.810] 10/13/2023 Yes    Hypomagnesemia [E83.42] 10/12/2023 Yes    Urinary tract infection without  hematuria [N39.0] 10/11/2023 Yes    Metabolic acidosis [E87.20] 10/11/2023 Yes    PVC (premature ventricular contraction) [I49.3] 10/11/2023 Yes    Shoulder pain [M25.519] 10/10/2023 Yes    Syncope [R55] 10/10/2023 Yes    History of CVA (cerebrovascular accident) [Z86.73] 10/10/2023 Not Applicable      Problems Resolved During this Admission:       Discharged Condition: stable    Disposition: Skilled Nursing Facility    Follow Up:   Follow-up Information       No, Primary Doctor Follow up in 1 week(s).                           Patient Instructions:      Ambulatory referral/consult to Cardiology   Standing Status: Future   Referral Priority: Routine Referral Type: Consultation   Referral Reason: Specialty Services Required   Requested Specialty: Cardiology   Number of Visits Requested: 1     Activity as tolerated     Activity as tolerated       Significant Diagnostic Studies: Labs:   BMP:   Recent Labs   Lab 10/15/23  0346         K 3.6      CO2 22*   BUN 28*   CREATININE 1.6*   CALCIUM 8.2*   MG 1.7   , CMP   Recent Labs   Lab 10/15/23  0346      K 3.6      CO2 22*      BUN 28*   CREATININE 1.6*   CALCIUM 8.2*   ANIONGAP 9    and CBC   Recent Labs   Lab 10/15/23  0346   WBC 5.51   HGB 9.8*   HCT 31.8*        Microbiology:   Blood Culture   Lab Results   Component Value Date    LABBLOO No Growth after 4 days. 10/10/2023    LABBLOO No Growth after 4 days. 10/10/2023    and Urine Culture    Lab Results   Component Value Date    LABURIN ESCHERICHIA COLI  >100,000 cfu/ml   (A) 10/10/2023     Radiology: X-Ray: CXR: X-Ray Chest 1 View (CXR): No results found for this visit on 10/10/23. and X-Ray Chest PA and Lateral (CXR): No results found for this visit on 10/10/23.    Pending Diagnostic Studies:       Procedure Component Value Units Date/Time    Echo [[id number]]     Order Status: Sent Lab Status: No result            Medications:  Reconciled Home Medications:      Medication  List        START taking these medications      acetaminophen 325 MG tablet  Commonly known as: TYLENOL  Take 2 tablets (650 mg total) by mouth every 4 (four) hours as needed.            CONTINUE taking these medications      ergocalciferol 50,000 unit Cap  Commonly known as: ERGOCALCIFEROL  Take 1 capsule by mouth Daily.     VITAMIN B-12 100 MCG tablet  Generic drug: cyanocobalamin  Take 100 mcg by mouth once daily.            STOP taking these medications      telmisartan 80 MG Tab  Commonly known as: MICARDIS              Indwelling Lines/Drains at time of discharge:   Lines/Drains/Airways       None                   Time spent on the discharge of patient: less than 30  minutes         Jane Leyva MD  Department of Hospital Medicine  [facility]

## 2023-10-16 NOTE — PLAN OF CARE
Call placed to Tita with [facility] to advise that PT/OT has entered notes for review.  TN to follow for auth

## 2023-10-16 NOTE — PLAN OF CARE
Call received from Tita with [facility] to provide auth for pt to d/c to SNF on today.  Auth [id number]    Call placed to [facility] to provide auth.  Call report provided-- pt to go to room P110 and nurse to call [phone].

## 2023-10-16 NOTE — NURSING
Nurses Note -- 4 Eyes      10/16/2023   7:57 AM      Skin assessed during: Daily Assessment      [x] No Altered Skin Integrity Present    []Prevention Measures Documented      [] Yes- Altered Skin Integrity Present or Discovered   [] LDA Added if Not in Epic (Describe Wound)   [] New Altered Skin Integrity was Present on Admit and Documented in LDA   [] Wound Image Taken    Wound Care Consulted? No    Attending Nurse:  Connie Bae RN/Staff Member:   Constantin

## 2023-10-16 NOTE — ASSESSMENT & PLAN NOTE
Initially concern based on chest xray with CT imaging showing aneurysmal dilatation of ascending thoracic aorta . Based on documentation it appears this was seen previously as well.   Cardiology consult - 4.6 cm on echo and recommend repeat echo in 6 months,referraklm is done.

## 2023-10-16 NOTE — PLAN OF CARE
ADT 30 order placed for Stretcher Transportation.  Requested  time: 3:30pm  If transportation does not arrive at ETA time nurse will be instructed to follow protocol for transportation below:  How can I get in touch directly with dispatch, if needed?                 Non-emergent (stretcher): [phone]  option 6     ++NURSING:  If Stretcher does not arrive at requested time please call the above Non Emergent Dispatcher.  If issue not resolved please escalate to your charge nurse for further instructions.

## 2023-10-16 NOTE — PLAN OF CARE
Call placed to Truong with [facility] to advise that the pt is medically ready for d/c and see if they are able to accept on today.  Truong reports consents are signed and just need to send orders and get auth from [facility].    Call placed to Tita with N to advise that the pt is medically ready for on today.  Tita unavailable [facility].  TN to follow for call back

## 2023-10-16 NOTE — PLAN OF CARE
Problem: Adult Inpatient Plan of Care  Goal: Plan of Care Review  Outcome: Ongoing, Not Progressing  Goal: Patient-Specific Goal (Individualized)  Outcome: Ongoing, Not Progressing  Goal: Absence of Hospital-Acquired Illness or Injury  Outcome: Ongoing, Not Progressing  Goal: Optimal Comfort and Wellbeing  Outcome: Ongoing, Not Progressing  Goal: Readiness for Transition of Care  Outcome: Ongoing, Not Progressing     Problem: Skin Injury Risk Increased  Goal: Skin Health and Integrity  Outcome: Ongoing, Not Progressing     Problem: Fluid and Electrolyte Imbalance (Acute Kidney Injury/Impairment)  Goal: Fluid and Electrolyte Balance  Outcome: Ongoing, Not Progressing     Problem: Oral Intake Inadequate (Acute Kidney Injury/Impairment)  Goal: Optimal Nutrition Intake  Outcome: Ongoing, Not Progressing     Problem: Renal Function Impairment (Acute Kidney Injury/Impairment)  Goal: Effective Renal Function  Outcome: Ongoing, Not Progressing     Problem: Impaired Wound Healing  Goal: Optimal Wound Healing  Outcome: Ongoing, Not Progressing     Problem: Fall Injury Risk  Goal: Absence of Fall and Fall-Related Injury  Outcome: Ongoing, Not Progressing

## 2023-10-16 NOTE — PLAN OF CARE
Plan of care uploaded to [facility] and sent to [facility] for review.  TN updated [place] that call has been placed to [facility] to obtain auth.    1025- call placed to Tita with [facility] to advise that the pt has d/c order in and is medically ready to d/c to SNF on today.  Tita to review and get back with TN

## 2023-10-16 NOTE — NURSING
Report called to [facility].  Patient with a bowel movement since 10/10/2023.  Facility states that they are unable to accept the patient with a bowel movement.  MD notified.  Order placed for a lactulose enema.  Pharmacy notified of missing dose.

## 2023-10-16 NOTE — PT/OT/SLP PROGRESS
Occupational Therapy   Treatment    Name: Traci Blanca  MRN: [id number]  Admitting Diagnosis:  Acute renal failure superimposed on stage 3 chronic kidney disease       Recommendations:     Discharge Recommendations:    Discharge Equipment Recommendations:  none  Barriers to discharge:  None    Assessment:     Traci Blanca is a 72 y.o. female with a medical diagnosis of Acute renal failure superimposed on stage 3 chronic kidney disease.  Performance deficits affecting function are weakness, impaired endurance, impaired self care skills, impaired functional mobility, impaired balance, decreased coordination, decreased upper extremity function, decreased lower extremity function, decreased safety awareness, pain, decreased ROM, edema, impaired joint extensibility.   The patient required max assist x2 person for supine<>sit with c/o pain, stiffness and fear of falling. The patient was dependent for initial sitting balance with max verbal and tactile cues to correct her posture and then SBA. The patient was able to sit on the EOB ~15-20 to perform therex and self care tasks. The patient remains appropriate for SNF to allow the patient to return home.    Rehab Prognosis:  Good; patient would benefit from acute skilled OT services to address these deficits and reach maximum level of function.       Plan:     Patient to be seen 5 x/week to address the above listed problems via self-care/home management, therapeutic activities, therapeutic exercises  Plan of Care Expires: 10/26/23  Plan of Care Reviewed with: patient    Subjective     Chief Complaint: right knee pain and stiffness  Patient/Family Comments/goals: wants to be able to transfer to her W/C (I)  Pain/Comfort:  Pain Rating 1:  (yes-did not rate)  Location - Side 1: Right  Location 1: knee  Pain Addressed 1: Pre-medicate for activity, Reposition, Distraction, Cessation of Activity  Pain Rating Post-Intervention 1:  (Patient reports right knee pain improved when  sitting in the W/C)    Objective:     Communicated with: nurse prior to session.  Patient found supine with peripheral IV, PureWick, telemetry , Pressure relief boots and B SCD upon OT entry to room.    General Precautions: Standard, fall    Orthopedic Precautions:N/A  Braces: N/A  Respiratory Status: Room air     Occupational Performance:     Bed Mobility:    Patient completed Rolling/Turning to Left with  maximal assistance  Patient completed Rolling/Turning to Right with maximal assistance  Patient completed Scooting/Bridging with dependent and 2 persons  Patient completed Supine to Sit with dependent and 2 persons  Patient completed Sit to Supine with dependent and 2 persons     Functional Mobility/Transfers:  Patient completed Bed <> Wheelchair Transfer using Scoot Pivot technique with dependence and of 2 persons with no assistive device  Functional Mobility: The patient sat on the EOB ~15-20 min and was dependent for initial sitting balance with max verbal and tactile cues to correct her posture and then SBA. The patient with strong posterior and right lean with c/o fear of falling.   The patient was dependent for positioning of the W/C to the transfer surface and for W/C parts management. The patient was able to propel her W/C with SBA.    Activities of Daily Living:  Upper Body Dressing: maximal assistance to don/doff back gown  Lower Body Dressing: dependence        [facility] 6 Click ADL: 14    Treatment & Education:  Performed self care and functional mobility as noted above  Postural exercise for forward/right side lean lean   Performed AROM to the R shldr x6 reps and stopped 2* c/o pain  Discussed OT goals/POC    Patient left right sidelying with HOB elevated with all lines intact, call button in reach, bed alarm on, and pressure relif boots and SCD reappalied    GOALS:   Multidisciplinary Problems       Occupational Therapy Goals          Problem: Occupational Therapy    Goal Priority Disciplines Outcome  Interventions   Occupational Therapy Goal     OT, PT/OT Ongoing, Progressing    Description: Goals to be met by: 10/26/23     Patient will increase functional independence with ADLs by performing:    Feeding with Modified [place] and Set-up Assistance.  UE Dressing with Contact Guard Assistance.  LE Dressing with Contact Guard Assistance.  Grooming while seated at sink with Modified [place] and Supervision.  Toileting from bedside commode with Set-up Assistance and Supervision for hygiene and clothing management.   Sitting at edge of bed x30 minutes with Modified [place].  Rolling to Bilateral with Modified [place] and Supervision.   Supine to sit with Modified [place], Set-up Assistance, and use of bedrail as needed.  Squat pivot transfers with Stand-by Assistance.  Toilet transfer to bedside commode with Stand-by Assistance.  Upper extremity exercise program x15 reps per handout, with independence.                         Time Tracking:     OT Date of Treatment: 10/16/23  OT Start Time: 1041  OT Stop Time: 1128  OT Total Time (min): 47 min    Billable Minutes:Self Care/Home Management 15  Therapeutic Activity 8  Total Time 47 (with PT)    OT/NROMA: OT          10/16/2023

## 2023-10-17 VITALS
HEIGHT: 70 IN | SYSTOLIC BLOOD PRESSURE: 132 MMHG | BODY MASS INDEX: 22.95 KG/M2 | WEIGHT: 160.31 LBS | RESPIRATION RATE: 15 BRPM | DIASTOLIC BLOOD PRESSURE: 70 MMHG | HEART RATE: 77 BPM | OXYGEN SATURATION: 97 % | TEMPERATURE: 98 F

## 2023-10-17 LAB
BACTERIA SPEC AEROBE CULT: NO GROWTH
BACTERIA SPEC ANAEROBE CULT: NORMAL

## 2023-10-17 PROCEDURE — G0378 HOSPITAL OBSERVATION PER HR: HCPCS

## 2023-10-17 PROCEDURE — 25000003 PHARM REV CODE 250: Performed by: HOSPITALIST

## 2023-10-17 RX ADMIN — AMOXICILLIN AND CLAVULANATE POTASSIUM 500 MG: 500; 125 TABLET, FILM COATED ORAL at 08:10

## 2023-10-17 NOTE — PLAN OF CARE
10/17/23 0813   Final Note   Assessment Type Final Discharge Note   Anticipated Discharge Disposition SNF   Hospital Resources/Appts/Education Provided Post-Acute resouces added to AVS   Post-Acute Status   Post-Acute Authorization Placement   Post-Acute Placement Status Set-up Complete/Auth obtained   Discharge Delays (!) PFC Arranged Transportation

## 2023-10-17 NOTE — NURSING
[facility], [facility]  Nurses Note -- 4 Eyes      10/17/2023       Skin assessed on: Discharge      [x] No Pressure Injuries Present    [x]Prevention Measures Documented    [] Yes LDA  for Pressure Injury Previously documented     [] Yes New Pressure Injury Discovered   [] LDA for New Pressure Injury Added      Attending RN:  Terrie Bernabe RN     Second RN:  Gena Kenny RN

## 2023-10-17 NOTE — PLAN OF CARE
Call placed to Truong at [facility] advise of pt having bowel movement and inquire if they can accept pt on today.  Truong advised just needed updated plan of care to be sent and the pt is good to come to facility on today.  Message sent to Dr Woody to request updated orders and d/c if pt is medically ready for d/c on today.

## 2023-10-17 NOTE — PLAN OF CARE
Provided pts nurse Terrie with call report, requested she call for 9am and advised that transportation is requested for 10am      ADT 30 order placed for [employer] Transportation.  Requested  time: 10am  If transportation does not arrive at ETA time nurse will be instructed to follow protocol for transportation below:  How can I get in touch directly with dispatch, if needed?                 Non-emergent (stretcher): [phone]  option 6     ++NURSING:  If Stretcher does not arrive at requested time please call the above Non Emergent Dispatcher.  If issue not resolved please escalate to your charge nurse for further instructions.

## 2023-10-17 NOTE — NURSING
[facility], [facility] [place]  Nurses Note -- 4 Eyes      10/17/2023       Skin assessed on: Q Shift      [x] No Pressure Injuries Present    [x]Prevention Measures Documented    [] Yes LDA  for Pressure Injury Previously documented     [] Yes New Pressure Injury Discovered   [] LDA for New Pressure Injury Added      Attending RN:  Terrie Bernabe RN     Second RN:  HALLE Francois

## 2023-10-17 NOTE — NURSING
Report given to GUILLE Alonso, pt did have a bowel movement last night. Pt AAOx4, respirations even and unlabored, no acute distress, no complaints of pain, safety precautions in place, call light in reach. Will continue to monitor.     [facility], [facility] [place]  Nurses Note -- 4 Eyes      10/17/2023       Skin assessed on: Q Shift      [x] No Pressure Injuries Present    [x]Prevention Measures Documented    [] Yes LDA  for Pressure Injury Previously documented     [] Yes New Pressure Injury Discovered   [] LDA for New Pressure Injury Added      Attending RN:  Terrie Bernabe, RN     Second RN:  HALLE Francois

## 2024-01-15 PROBLEM — N39.0 URINARY TRACT INFECTION WITHOUT HEMATURIA: Status: RESOLVED | Noted: 2023-10-11 | Resolved: 2024-01-15

## 2024-01-15 PROBLEM — N18.30 ACUTE RENAL FAILURE SUPERIMPOSED ON STAGE 3 CHRONIC KIDNEY DISEASE: Status: RESOLVED | Noted: 2023-10-10 | Resolved: 2024-01-15

## 2024-01-15 PROBLEM — N17.9 ACUTE RENAL FAILURE SUPERIMPOSED ON STAGE 3 CHRONIC KIDNEY DISEASE: Status: RESOLVED | Noted: 2023-10-10 | Resolved: 2024-01-15

## 2024-10-02 ENCOUNTER — PATIENT MESSAGE (OUTPATIENT)
Dept: RESEARCH | Facility: CLINIC | Age: 74
End: 2024-10-02